# Patient Record
Sex: FEMALE | Race: WHITE | NOT HISPANIC OR LATINO
[De-identification: names, ages, dates, MRNs, and addresses within clinical notes are randomized per-mention and may not be internally consistent; named-entity substitution may affect disease eponyms.]

---

## 2017-08-24 ENCOUNTER — TRANSCRIPTION ENCOUNTER (OUTPATIENT)
Age: 42
End: 2017-08-24

## 2017-11-20 ENCOUNTER — TRANSCRIPTION ENCOUNTER (OUTPATIENT)
Age: 42
End: 2017-11-20

## 2017-11-23 ENCOUNTER — TRANSCRIPTION ENCOUNTER (OUTPATIENT)
Age: 42
End: 2017-11-23

## 2018-01-24 ENCOUNTER — TRANSCRIPTION ENCOUNTER (OUTPATIENT)
Age: 43
End: 2018-01-24

## 2019-03-21 ENCOUNTER — TRANSCRIPTION ENCOUNTER (OUTPATIENT)
Age: 44
End: 2019-03-21

## 2019-04-02 ENCOUNTER — APPOINTMENT (OUTPATIENT)
Dept: ORTHOPEDIC SURGERY | Facility: CLINIC | Age: 44
End: 2019-04-02
Payer: COMMERCIAL

## 2019-04-02 VITALS
DIASTOLIC BLOOD PRESSURE: 84 MMHG | SYSTOLIC BLOOD PRESSURE: 145 MMHG | BODY MASS INDEX: 27.58 KG/M2 | HEIGHT: 71 IN | WEIGHT: 197 LBS | TEMPERATURE: 98.4 F | HEART RATE: 73 BPM

## 2019-04-02 DIAGNOSIS — Z78.9 OTHER SPECIFIED HEALTH STATUS: ICD-10-CM

## 2019-04-02 DIAGNOSIS — Z87.39 PERSONAL HISTORY OF OTHER DISEASES OF THE MUSCULOSKELETAL SYSTEM AND CONNECTIVE TISSUE: ICD-10-CM

## 2019-04-02 DIAGNOSIS — Z82.61 FAMILY HISTORY OF ARTHRITIS: ICD-10-CM

## 2019-04-02 PROCEDURE — 99214 OFFICE O/P EST MOD 30 MIN: CPT

## 2019-04-02 PROCEDURE — 73560 X-RAY EXAM OF KNEE 1 OR 2: CPT | Mod: TC,RT

## 2019-04-09 ENCOUNTER — APPOINTMENT (OUTPATIENT)
Dept: ORTHOPEDIC SURGERY | Facility: CLINIC | Age: 44
End: 2019-04-09
Payer: COMMERCIAL

## 2019-04-09 PROCEDURE — 99214 OFFICE O/P EST MOD 30 MIN: CPT

## 2019-04-09 PROCEDURE — 73560 X-RAY EXAM OF KNEE 1 OR 2: CPT | Mod: TC,RT

## 2019-04-09 RX ORDER — PSYLLIUM HUSK 0.4 G
CAPSULE ORAL
Refills: 0 | Status: ACTIVE | COMMUNITY

## 2019-04-09 RX ORDER — CHOLECALCIFEROL (VITAMIN D3) 25 MCG
TABLET ORAL
Refills: 0 | Status: ACTIVE | COMMUNITY

## 2019-04-10 NOTE — REVIEW OF SYSTEMS
[Joint Pain] : joint pain [Joint Stiffness] : joint stiffness [Joint Swelling] : joint swelling [Feeling Weak] : feeling weak [Muscle Weakness] : muscle weakness [Easy Bruising] : a tendency for easy bruising [Negative] : Psychiatric

## 2019-04-10 NOTE — DISCUSSION/SUMMARY
[de-identified] : At this time, due to osteoarthritis of bilateral knees, the patient will be set up for Supartz injections.  She is a self-pay.  We are also ordering her a derotational brace for the left knee, which she states she does have, but it is years old and she is not getting the stability out of it that she should be.  We are going to order a new one for her left knee.  She will return to the office next week to start her injections.\par \par The patient has been advised that their blood pressure today was outside normal ranges and the patient was instructed accordingly. Our Blood Pressure Monitoring Sheet with instructions was given to the patient.\par \par

## 2019-04-10 NOTE — PHYSICAL EXAM
[de-identified] : Right Knee: \par Range of Motion in Degrees	\par 	                  Claimant:	Normal:	\par Flexion Active	  135 	                135-degrees	\par Flexion Passive	  135	                135-degrees	\par Extension Active	  0-5	                0-5-degrees	\par Extension Passive	  0-5	                0-5-degrees	\par \par No weakness to flexion/extension.  No evidence of instability in the AP plane or varus or valgus stress.  Negative  Lachman.  Negative pivot shift.  Negative anterior drawer test.  Negative posterior drawer test.  Negative Roma.  Negative Apley grind.  No medial or lateral joint line tenderness.  Positive tenderness over the medial and lateral facet of the patella.  Positive patellofemoral crepitations.  No lateral tilting patella.  No patella apprehension.  Positive crepitation in the medial and lateral femoral condyle.  No proximal or distal swelling, edema or tenderness.  No gross motor or sensory deficits.  Mild intra-articular swelling.  2+ DP and PT pulses.  No varus or valgus malalignment.  Skin is intact.  No rashes, scars or lesions. \par \par Left Knee: \par Range of Motion in Degrees	\par 	                  Claimant:	Normal:	\par Flexion Active	  135 	                135-degrees	\par Flexion Passive	  135	                135-degrees	\par Extension Active	  0-5	                0-5-degrees	\par Extension Passive	  0-5	                0-5-degrees	\par \par No weakness to flexion/extension.  No evidence of instability in the AP plane or varus or valgus stress.  Negative  Lachman.  Negative pivot shift.  Negative anterior drawer test.  Negative posterior drawer test.  Negative Roma.  Negative Apley grind.  No medial or lateral joint line tenderness.  Positive tenderness over the medial and lateral facet of the patella.  Positive patellofemoral crepitations.  No lateral tilting patella.  No patella apprehension.  Positive crepitation in the medial and lateral femoral condyle.  No proximal or distal swelling, edema or tenderness.  No gross motor or sensory deficits.  Mild intra-articular swelling.  2+ DP and PT pulses.  No varus or valgus malalignment.  Skin is intact.  No rashes, scars or lesions.\par  [de-identified] : Ambulating with a slightly antalgic to antalgic gait.  Station:  Normal.  [de-identified] : Appearance:  Well-developed, well-nourished female in no acute distress.\par \par \par  [de-identified] : Radiographs, one-two views of the right knee, one-two views of the left knee and one view AP Standing, show no acute fractures or dislocations.

## 2019-04-10 NOTE — ADDENDUM
[FreeTextEntry1] : This note was written by Eun Mckeon on 04/10/2019 acting as a scribe for [employer] BASURTO OTR/CHELSEY, [place]

## 2019-04-10 NOTE — HISTORY OF PRESENT ILLNESS
[de-identified] : The patient comes in today with bilateral knee pain.  The patient states the onset/injury occurred in 2013.  This injury is not work related or due to an automobile accident.  The patient describes the pain as achy and stiff.  The patient notes exercise, rest, ice and ibuprofen makes her symptoms better, while walking, bending and lying down make her symptoms worse.  \par  [7] : the relief from medicine is 7/10 [2] : the ailment interference is 2/10 [3] : the ailment interference is 3/10 [4] : the ailment interference is 4/10 [de-identified] : Ice [] : No [de-identified] : Elevation [de-identified] : Ibuprofen [de-identified] : Anti-inflammatory

## 2019-04-16 ENCOUNTER — APPOINTMENT (OUTPATIENT)
Dept: ORTHOPEDIC SURGERY | Facility: CLINIC | Age: 44
End: 2019-04-16
Payer: COMMERCIAL

## 2019-04-16 PROCEDURE — 20610 DRAIN/INJ JOINT/BURSA W/O US: CPT | Mod: 50

## 2019-04-16 NOTE — ADDENDUM
[FreeTextEntry1] : This note was written by Adela Moore on 04/16/2019 acting as scribe for Lizet Myles, OTR/L, PA.\par \par

## 2019-04-16 NOTE — PROCEDURE
[de-identified] : Reason for Visit: \par DOROTEO JOHNSON comes in today for the first Supartz injection to the left knee and right knee. \par \par Physical Examination:\par Left Knee:\par Knee: Range of Motion in Degrees  	\par    	                 Claimant:  	Normal:  	\par Flexion Active  	 135     	                135-degrees  	\par Flexion Passive  	 135  	                135-degrees  	\par Extension Active  	 0-5  	                0-5-degrees  	\par Extension Passive  	 0-5  	                0-5-degrees  	\par \par No evidence of instability in the AP plane or varus or valgus stress.  Negative  Lachman. Negative pivot shift.  Negative anterior drawer test.  Negative posterior drawer test. Negative Roma.  Negative Apley grind.  No medial or lateral joint line tenderness. Positive tenderness over the medial and lateral facet of the patella.  Positive patellofemoral crepitations.  No lateral tilting patella.  No patellar apprehension. Positive crepitation in the medial and lateral femoral condyle.  No proximal or distal swelling, edema or tenderness.  No gross motor or sensory deficits.  Mild intra-articular swelling.  No varus or valgus malalignment.  2+ DP and PT pulses.  Skin is intact.  No rashes, scars or lesions.  \par \par Right Knee:\par Knee: Range of Motion in Degrees  	\par    	                 Claimant:  	Normal:  	\par Flexion Active  	 135     	                135-degrees  	\par Flexion Passive  	 135  	                135-degrees  	\par Extension Active  	 0-5  	                0-5-degrees  	\par Extension Passive  	 0-5  	                0-5-degrees  	\par \par No evidence of instability in the AP plane or varus or valgus stress.  Negative  Lachman. Negative pivot shift.  Negative anterior drawer test.  Negative posterior drawer test. Negative Roma.  Negative Apley grind.  No medial or lateral joint line tenderness. Positive tenderness over the medial and lateral facet of the patella.  Positive patellofemoral crepitations.  No lateral tilting patella.  No patellar apprehension. Positive crepitation in the medial and lateral femoral condyle.  No proximal or distal swelling, edema or tenderness.  No gross motor or sensory deficits.  Mild intra-articular swelling.  No varus or valgus malalignment.  2+ DP and PT pulses.  Skin is intact.  No rashes, scars or lesions.  \par \par Impression: \par Osteoarthritis of the left knee\par \par Osteoarthritis of the right knee\par \par \par Consent:\par The risks and benefits of the procedure were discussed with the patient in detail.  Upon verbal consent of the patient, we proceeded with the Supartz injection as noted below.  \par \par Procedure:\par After sterile prep, the patient underwent a Supartz injection of 25 mg of Sodium Hyaluronate in a 2ML syringe into the right and left knees.  The patient tolerated the procedure well.  There were no complications.  \par \par NDC#: [address]-7\par Lot #:   [id number]                  \par Expiration: 06/30/21\par \par \par Plan:\par I have recommended ice and elevation.  The patient will be reassessed in one week for the next Supartz injection for the left and right knee osteoarthritis.   \par \par \par

## 2019-04-22 NOTE — PROCEDURE
[de-identified] : Reason for Visit: \par DOROTEO JOHNSON comes in today for the second Supartz injection to the left knee and right knee.\par \par Physical Examination:\par Left Knee:\par Knee: Range of Motion in Degrees  	\par    	                 Claimant:  	Normal:  	\par Flexion Active  	 135     	                135-degrees  	\par Flexion Passive  	 135  	                135-degrees  	\par Extension Active  	 0-5  	                0-5-degrees  	\par Extension Passive  	 0-5  	                0-5-degrees  	\par \par No evidence of instability in the AP plane or varus or valgus stress.  Negative  Lachman. Negative pivot shift.  Negative anterior drawer test.  Negative posterior drawer test. Negative Roma.  Negative Apley grind.  No medial or lateral joint line tenderness. Positive tenderness over the medial and lateral facet of the patella.  Positive patellofemoral crepitations.  No lateral tilting patella.  No patellar apprehension. Positive crepitation in the medial and lateral femoral condyle.  No proximal or distal swelling, edema or tenderness.  No gross motor or sensory deficits.  Mild intra-articular swelling.  No varus or valgus malalignment.  2+ DP and PT pulses.  Skin is intact.  No rashes, scars or lesions.  \par \par Right Knee:\par Knee: Range of Motion in Degrees  	\par    	                 Claimant:  	Normal:  	\par Flexion Active  	 135     	                135-degrees  	\par Flexion Passive  	 135  	                135-degrees  	\par Extension Active  	 0-5  	                0-5-degrees  	\par Extension Passive  	 0-5  	                0-5-degrees  	\par \par No evidence of instability in the AP plane or varus or valgus stress.  Negative  Lachman. Negative pivot shift.  Negative anterior drawer test.  Negative posterior drawer test. Negative Roma.  Negative Apley grind.  No medial or lateral joint line tenderness. Positive tenderness over the medial and lateral facet of the patella.  Positive patellofemoral crepitations.  No lateral tilting patella.  No patellar apprehension. Positive crepitation in the medial and lateral femoral condyle.  No proximal or distal swelling, edema or tenderness.  No gross motor or sensory deficits.  Mild intra-articular swelling.  No varus or valgus malalignment.  2+ DP and PT pulses.  Skin is intact.  No rashes, scars or lesions.  \par \par Impression: \par Osteoarthritis of the left knee\par \par Osteoarthritis of the right knee\par \par Consent:\par The risks and benefits of the procedure were discussed with the patient in detail.  Upon verbal consent of the patient, we proceeded with the Supartz injections as noted below.  \par \par Procedure:\par After sterile prep, the patient underwent a Supartz injection of 25 mg of Sodium Hyaluronate in a 2 mL syringe into the right and left knee.  The patient tolerated the procedures well.  There were no complications.  \par \par NDC#:  [address]-4\par Lot#:    [id number] \par Exp Date:   06/30/21\par \par Plan:\par I have recommended ice and elevation.  The patient will be reassessed in one week for the next Supartz injection for the left and right knee osteoarthritis.   \par \par

## 2019-04-22 NOTE — ADDENDUM
[FreeTextEntry1] : This note was written by Loco Mckeon on 04/22/2019, acting as a scribe for [employer] BAS[employer], AMADOR/CHELSEY PA

## 2019-04-23 ENCOUNTER — APPOINTMENT (OUTPATIENT)
Dept: ORTHOPEDIC SURGERY | Facility: CLINIC | Age: 44
End: 2019-04-23
Payer: COMMERCIAL

## 2019-04-23 PROCEDURE — 20610 DRAIN/INJ JOINT/BURSA W/O US: CPT | Mod: 50

## 2019-04-29 NOTE — PROCEDURE
[de-identified] : Reason for Visit: \par DOROTEO JOHNSON comes in today for the third Supartz injection to the left knee and right knee.\par \par Physical Examination:\par Left Knee:\par Range of Motion in Degrees  	\par    	                 Claimant:  	Normal:  	\par Flexion Active  	 135     	                135-degrees  	\par Flexion Passive  	 135  	                135-degrees  	\par Extension Active  	 0-5  	                0-5-degrees  	\par Extension Passive  	 0-5  	                0-5-degrees  	\par \par No evidence of instability in the AP plane or varus or valgus stress.  Negative  Lachman. Negative pivot shift.  Negative anterior drawer test.  Negative posterior drawer test. Negative Roma.  Negative Apley grind.  No medial or lateral joint line tenderness. Positive tenderness over the medial and lateral facet of the patella.  Positive patellofemoral crepitations.  No lateral tilting patella.  No patellar apprehension. Positive crepitation in the medial and lateral femoral condyle.  No proximal or distal swelling, edema or tenderness.  No gross motor or sensory deficits.  Mild intra-articular swelling.  No varus or valgus malalignment.  2+ DP and PT pulses.  Skin is intact.  No rashes, scars or lesions.  \par \par Right Knee:\par Range of Motion in Degrees  	\par    	                 Claimant:  	Normal:  	\par Flexion Active  	 135     	                135-degrees  	\par Flexion Passive  	 135  	                135-degrees  	\par Extension Active  	 0-5  	                0-5-degrees  	\par Extension Passive  	 0-5  	                0-5-degrees  	\par \par No evidence of instability in the AP plane or varus or valgus stress.  Negative  Lachman. Negative pivot shift.  Negative anterior drawer test.  Negative posterior drawer test. Negative Roma.  Negative Apley grind.  No medial or lateral joint line tenderness. Positive tenderness over the medial and lateral facet of the patella.  Positive patellofemoral crepitations.  No lateral tilting patella.  No patellar apprehension. Positive crepitation in the medial and lateral femoral condyle.  No proximal or distal swelling, edema or tenderness.  No gross motor or sensory deficits.  Mild intra-articular swelling.  No varus or valgus malalignment.  2+ DP and PT pulses.  Skin is intact.  No rashes, scars or lesions.  \par \par Impression: \par Osteoarthritis of the left knee\par \par Osteoarthritis of the right knee\par \par Consent:\par The risks and benefits of the procedure were discussed with the patient in detail.  Upon verbal consent of the patient, we proceeded with the Supartz injections as noted below.  \par \par Procedure:\par After sterile prep, the patient underwent a Supartz injection of 25 mg of Sodium Hyaluronate in a 2 mL syringe into the right and left knee.  The patient tolerated the procedures well.  There were no complications.  \par \par NDC#:  [address]-9\par Lot#:    [id number] \par Expiration:   06/30/21\par \par Plan:\par I have recommended ice and elevation.  The patient will be reassessed in one week for the next Supartz injection to the left and right knee osteoarthritis.   \par \par

## 2019-04-29 NOTE — ADDENDUM
[FreeTextEntry1] : This note was written by Eun Mckeon on 04/29/2019 acting as a scribe for [employer] BASURTO

## 2019-04-30 ENCOUNTER — APPOINTMENT (OUTPATIENT)
Dept: ORTHOPEDIC SURGERY | Facility: CLINIC | Age: 44
End: 2019-04-30
Payer: COMMERCIAL

## 2019-04-30 PROCEDURE — 20610 DRAIN/INJ JOINT/BURSA W/O US: CPT | Mod: LT

## 2019-05-06 NOTE — ADDENDUM
[FreeTextEntry1] : This note was written by Loco Mckeon on 05/06/2019, acting as a scribe for [employer] BASURTO, AMADOR/CHELSEY, PA \par

## 2019-05-06 NOTE — PROCEDURE
[de-identified] : Reason for Visit: \par DOROTEO JOHNSON comes in today for the fourth Supartz injection to the left knee and right knee.\par \par Physical Examination:\par Left Knee:\par Knee: Range of Motion in Degrees  	\par    	                 Claimant:  	Normal:  	\par Flexion Active  	 135     	                135-degrees  	\par Flexion Passive  	 135  	                135-degrees  	\par Extension Active  	 0-5  	                0-5-degrees  	\par Extension Passive  	 0-5  	                0-5-degrees  	\par \par No evidence of instability in the AP plane or varus or valgus stress.  Negative  Lachman. Negative pivot shift.  Negative anterior drawer test.  Negative posterior drawer test. Negative Roma.  Negative Apley grind.  No medial or lateral joint line tenderness. Positive tenderness over the medial and lateral facet of the patella.  Positive patellofemoral crepitations.  No lateral tilting patella.  No patellar apprehension. Positive crepitation in the medial and lateral femoral condyle.  No proximal or distal swelling, edema or tenderness.  No gross motor or sensory deficits.  Mild intra-articular swelling.  No varus or valgus malalignment.  2+ DP and PT pulses.  Skin is intact.  No rashes, scars or lesions.  \par \par Right Knee:\par Knee: Range of Motion in Degrees  	\par    	                 Claimant:  	Normal:  	\par Flexion Active  	 135     	                135-degrees  	\par Flexion Passive  	 135  	                135-degrees  	\par Extension Active  	 0-5  	                0-5-degrees  	\par Extension Passive  	 0-5  	                0-5-degrees  	\par \par No evidence of instability in the AP plane or varus or valgus stress.  Negative  Lachman. Negative pivot shift.  Negative anterior drawer test.  Negative posterior drawer test. Negative Roma.  Negative Apley grind.  No medial or lateral joint line tenderness. Positive tenderness over the medial and lateral facet of the patella.  Positive patellofemoral crepitations.  No lateral tilting patella.  No patellar apprehension. Positive crepitation in the medial and lateral femoral condyle.  No proximal or distal swelling, edema or tenderness.  No gross motor or sensory deficits.  Mild intra-articular swelling.  No varus or valgus malalignment.  2+ DP and PT pulses.  Skin is intact.  No rashes, scars or lesions.  \par \par Impression: \par Osteoarthritis of the left knee\par \par Osteoarthritis of the right knee\par \par Consent:\par The risks and benefits of the procedure were discussed with the patient in detail.  Upon verbal consent of the patient, we proceeded with the Supartz injections as noted below.  \par \par Procedure:\par After sterile prep, the patient underwent a Supartz injection of 25 mg of Sodium Hyaluronate in a 2 mL syringe into the right and left knee.  The patient tolerated the procedures well.  There were no complications.  \par \par NDC#:  [address]-2\par Lot#:    [id number]\par Exp Date:  06/30/21  \par \par Plan:\par I have recommended ice and elevation.  The patient will be reassessed in one week for the next Supartz injection for the left and right knee osteoarthritis.  \par  \par \par

## 2019-05-07 ENCOUNTER — APPOINTMENT (OUTPATIENT)
Dept: ORTHOPEDIC SURGERY | Facility: CLINIC | Age: 44
End: 2019-05-07
Payer: COMMERCIAL

## 2019-05-07 PROCEDURE — 20610 DRAIN/INJ JOINT/BURSA W/O US: CPT | Mod: LT

## 2019-05-10 NOTE — PROCEDURE
[de-identified] : Reason for Visit: \par DOROTEO JOHNSON comes in today for the fifth Supartz injection to the left knee and right knee.\par \par Physical Examination:\par Left Knee:\par Knee: Range of Motion in Degrees  	\par    	                 Claimant:  	Normal:  	\par Flexion Active  	 135     	                135-degrees  	\par Flexion Passive  	 135  	                135-degrees  	\par Extension Active  	 0-5  	                0-5-degrees  	\par Extension Passive  	 0-5  	                0-5-degrees  	\par \par No evidence of instability in the AP plane or varus or valgus stress.  Negative  Lachman. Negative pivot shift.  Negative anterior drawer test.  Negative posterior drawer test. Negative Roma.  Negative Apley grind.  No medial or lateral joint line tenderness. Positive tenderness over the medial and lateral facet of the patella.  Positive patellofemoral crepitations.  No lateral tilting patella.  No patellar apprehension. Positive crepitation in the medial and lateral femoral condyle.  No proximal or distal swelling, edema or tenderness.  No gross motor or sensory deficits.  Mild intra-articular swelling.  No varus or valgus malalignment.  2+ DP and PT pulses.  Skin is intact.  No rashes, scars or lesions.  \par \par Right Knee:\par Knee: Range of Motion in Degrees  	\par    	                 Claimant:  	Normal:  	\par Flexion Active  	 135     	                135-degrees  	\par Flexion Passive  	 135  	                135-degrees  	\par Extension Active  	 0-5  	                0-5-degrees  	\par Extension Passive  	 0-5  	                0-5-degrees  	\par \par No evidence of instability in the AP plane or varus or valgus stress.  Negative  Lachman. Negative pivot shift.  Negative anterior drawer test.  Negative posterior drawer test. Negative Roma.  Negative Apley grind.  No medial or lateral joint line tenderness. Positive tenderness over the medial and lateral facet of the patella.  Positive patellofemoral crepitations.  No lateral tilting patella.  No patellar apprehension. Positive crepitation in the medial and lateral femoral condyle.  No proximal or distal swelling, edema or tenderness.  No gross motor or sensory deficits.  Mild intra-articular swelling.  No varus or valgus malalignment.  2+ DP and PT pulses.  Skin is intact.  No rashes, scars or lesions.  \par \par Impression: \par Osteoarthritis of the left knee\par \par Osteoarthritis of the right knee\par \par Consent:\par The risks and benefits of the procedure were discussed with the patient in detail.  Upon verbal consent of the patient, we proceeded with the Supartz injections as noted below.  \par \par Procedure:\par After sterile prep, the patient underwent a Supartz injection of 25 mg of Sodium Hyaluronate in a 2 mL syringe into the right and left knee.  The patient tolerated the procedures well.  There were no complications.  \par \par NDC#:  [address]-0\par Lot#:    [id number] \par Exp Date:   06/30/21\par \par Plan:\par I have recommended ice and elevation.  The patient will be reassessed in 6-8 weeks for the left and right knee osteoarthritis.  \par \par \par \par  \par \par

## 2019-05-10 NOTE — ADDENDUM
[FreeTextEntry1] : This note was written by Loco Mckeon on 05/10/2019, acting as a scribe for [employer] BASURTO, AMADOR/CHELSEY PA

## 2019-05-28 ENCOUNTER — TRANSCRIPTION ENCOUNTER (OUTPATIENT)
Age: 44
End: 2019-05-28

## 2020-10-20 ENCOUNTER — APPOINTMENT (OUTPATIENT)
Dept: ORTHOPEDIC SURGERY | Facility: CLINIC | Age: 45
End: 2020-10-20
Payer: COMMERCIAL

## 2020-10-20 VITALS
DIASTOLIC BLOOD PRESSURE: 86 MMHG | WEIGHT: 220 LBS | HEART RATE: 72 BPM | TEMPERATURE: 98.9 F | BODY MASS INDEX: 30.8 KG/M2 | HEIGHT: 71 IN | SYSTOLIC BLOOD PRESSURE: 134 MMHG

## 2020-10-20 PROCEDURE — 99213 OFFICE O/P EST LOW 20 MIN: CPT

## 2020-10-20 PROCEDURE — 99072 ADDL SUPL MATRL&STAF TM PHE: CPT

## 2020-10-20 PROCEDURE — 73560 X-RAY EXAM OF KNEE 1 OR 2: CPT | Mod: 26,RT

## 2020-10-20 RX ORDER — SODIUM HYALURONATE INTRA-ARTICULAR SOLN PREF SYR 25 MG/2.5ML 25/2.5 MG/ML
25 SOLUTION PREFILLED SYRINGE INTRAARTICULAR
Qty: 10 | Refills: 0 | Status: ACTIVE | OUTPATIENT
Start: 2020-10-20

## 2020-10-22 RX ORDER — HYALURONATE SODIUM 30 MG/2 ML
30 SYRINGE (ML) INTRAARTICULAR
Qty: 8 | Refills: 0 | Status: ACTIVE | OUTPATIENT
Start: 2020-10-22

## 2020-10-22 NOTE — DISCUSSION/SUMMARY
[de-identified] : At this time, due to osteoarthritis of bilateral knees, we will order Supartz injections again for bilateral knees.  She will return to the office when we get them in.

## 2020-10-22 NOTE — ADDENDUM
[FreeTextEntry1] : This note was written by Eun Mckeon on 10/22/2020 acting as scribe for Lizet Myles, OTR/L, PA

## 2020-10-22 NOTE — PHYSICAL EXAM
[de-identified] : Right Knee: (Right knee is worse than her left knee)\par Range of Motion in Degrees	\par 	                  Claimant:	Normal:	\par Flexion Active	  135 	                135-degrees	\par Flexion Passive	  135	                135-degrees	\par Extension Active	  0-5	                0-5-degrees	\par Extension Passive	  0-5	                0-5-degrees	\par \par No weakness to flexion/extension.  No evidence of instability in the AP plane or varus or valgus stress.  Negative  Lachman.  Negative pivot shift.  Negative anterior drawer test.  Negative posterior drawer test.  Negative Roma.  Negative Apley grind.  No medial or lateral joint line tenderness.  Positive tenderness over the medial and lateral facet of the patella.  Positive patellofemoral crepitations.  No lateral tilting patella.  No patella apprehension.  Positive crepitation in the medial and lateral femoral condyle.  No proximal or distal swelling, edema or tenderness.  No gross motor or sensory deficits.  Mild intra-articular swelling.  2+ DP and PT pulses.  No varus or valgus malalignment.  Skin is intact.  No rashes, scars or lesions. \par \par Left Knee: \par Range of Motion in Degrees	\par 	                  Claimant:	Normal:	\par Flexion Active	  135 	                135-degrees	\par Flexion Passive	  135	                135-degrees	\par Extension Active	  0-5	                0-5-degrees	\par Extension Passive	  0-5	                0-5-degrees	\par \par No weakness to flexion/extension.  No evidence of instability in the AP plane or varus or valgus stress.  Negative  Lachman.  Negative pivot shift.  Negative anterior drawer test.  Negative posterior drawer test.  Negative Roma.  Negative Apley grind.  No medial or lateral joint line tenderness.  Positive tenderness over the medial and lateral facet of the patella.  Positive patellofemoral crepitations.  No lateral tilting patella.  No patella apprehension.  Positive crepitation in the medial and lateral femoral condyle.  No proximal or distal swelling, edema or tenderness.  No gross motor or sensory deficits.  Mild intra-articular swelling.  2+ DP and PT pulses.  No varus or valgus malalignment.  Skin is intact.  No rashes, scars or lesions. \par  [de-identified] : Ambulating with a slightly antalgic to antalgic gait.  Station:  Normal.  [de-identified] : Appearance:  Well-developed, well-nourished female in no acute distress.\par   [de-identified] : Radiographs, two views of the right knee, reveals that she does have medial joint line pain with some narrowing of the medial joint line and moderate arthritis of the right knee.

## 2020-10-30 ENCOUNTER — APPOINTMENT (OUTPATIENT)
Dept: ORTHOPEDIC SURGERY | Facility: CLINIC | Age: 45
End: 2020-10-30
Payer: COMMERCIAL

## 2020-10-30 PROCEDURE — 20610 DRAIN/INJ JOINT/BURSA W/O US: CPT | Mod: 50

## 2020-10-30 PROCEDURE — 99072 ADDL SUPL MATRL&STAF TM PHE: CPT

## 2020-11-02 NOTE — ADDENDUM
[FreeTextEntry1] : This note was written by Loco Mckeon on 11/02/2020, acting as a scribe for NATO BASURTO, AMADOR/L, PA

## 2020-11-06 ENCOUNTER — APPOINTMENT (OUTPATIENT)
Dept: ORTHOPEDIC SURGERY | Facility: CLINIC | Age: 45
End: 2020-11-06
Payer: COMMERCIAL

## 2020-11-06 PROCEDURE — 20610 DRAIN/INJ JOINT/BURSA W/O US: CPT | Mod: 50

## 2020-11-06 PROCEDURE — 99072 ADDL SUPL MATRL&STAF TM PHE: CPT

## 2020-11-10 NOTE — ADDENDUM
[FreeTextEntry1] : This note was written by Loco Mckeon on 11/10/2020, acting as a scribe for NATO BASURTO, AMADOR/L, PA

## 2020-11-10 NOTE — PROCEDURE
[de-identified] : \par Physical Examination\par Right Knee:\par Knee: Range of Motion in Degrees  	\par    	                 Claimant:  	Normal:  	\par Flexion Active  	 135     	                135-degrees  	\par Flexion Passive  	 135  	                135-degrees  	\par Extension Active  	 0-5  	                0-5-degrees  	\par Extension Passive  	 0-5  	                0-5-degrees  	\par \par No evidence of instability in the AP plane or varus or valgus stress.  Negative  Lachman. Negative pivot shift.  Negative anterior drawer test.  Negative posterior drawer test.  Negative Roma.  Negative Apley grind.  No medial or lateral joint line tenderness.  Positive tenderness over the medial and lateral facet of the patella.  Positive patellofemoral crepitations.  No lateral tilting patella.  No patellar apprehension.  Positive crepitation in the medial and lateral femoral condyle.  No proximal or distal swelling, edema or tenderness.  No gross motor or sensory deficits.  Mild intra-articular swelling.  No varus or valgus malalignment.  2+ DP and PT pulses.  Skin is intact.  No rashes, scars or lesions.  \par \par Left Knee:\par Knee: Range of Motion in Degrees  	\par    	                 Claimant:  	Normal:  	\par Flexion Active  	 135     	                135-degrees  	\par Flexion Passive  	 135  	                135-degrees  	\par Extension Active  	 0-5  	                0-5-degrees  	\par Extension Passive  	 0-5  	                0-5-degrees  	\par \par No evidence of instability in the AP plane or varus or valgus stress.  Negative  Lachman. Negative pivot shift.  Negative anterior drawer test.  Negative posterior drawer test.  Negative Roma.  Negative Apley grind.  No medial or lateral joint line tenderness.  Positive tenderness over the medial and lateral facet of the patella.  Positive patellofemoral crepitations.  No lateral tilting patella.  No patellar apprehension.  Positive crepitation in the medial and lateral femoral condyle.  No proximal or distal swelling, edema or tenderness.  No gross motor or sensory deficits.  Mild intra-articular swelling.  No varus or valgus malalignment.  2+ DP and PT pulses.  Skin is intact.  No rashes, scars or lesions.  \par \par Impression: \par Osteoarthritis of the right knee\par Osteoarthritis of the left knee \par \par Consent:\par The risks and benefits of the procedure were discussed with the patient in detail.  Upon verbal consent of the patient, we proceeded with the Orthovisc injections as noted below.  \par \par Procedure: \par After sterile prep, the patient underwent an Orthovisc injection of 30 mg of Sodium Hyaluronate in a 2 mL syringe into the right and left knee.  The patient tolerated the procedure well.  There were no complications.   \par \par NDC#:  [address]-51\par Lot#:    [id number]\par Exp. Date:  05/22\par \par Plan: \par At this time, I have recommended ice and elevation.  The patient will be reassessed in one week for the next Orthovisc injection for the osteoarthritis of the right and left knee.\par

## 2020-11-13 ENCOUNTER — APPOINTMENT (OUTPATIENT)
Dept: ORTHOPEDIC SURGERY | Facility: CLINIC | Age: 45
End: 2020-11-13
Payer: COMMERCIAL

## 2020-11-13 PROCEDURE — 20610 DRAIN/INJ JOINT/BURSA W/O US: CPT | Mod: 50

## 2020-11-14 ENCOUNTER — TRANSCRIPTION ENCOUNTER (OUTPATIENT)
Age: 45
End: 2020-11-14

## 2020-11-17 NOTE — ADDENDUM
[FreeTextEntry1] : This note was written by Adela Moore on 11/17/2020 acting as scribe for Lizet Myles, SOFIAR/CHELSEY, PA.\par

## 2020-11-17 NOTE — PROCEDURE
[de-identified] : Physical Examination\par Right Knee:\par Knee: Range of Motion in Degrees  	\par    	                 Claimant:  	Normal:  	\par Flexion Active  	 135     	                135-degrees  	\par Flexion Passive  	 135  	                135-degrees  	\par Extension Active  	 0-5  	                0-5-degrees  	\par Extension Passive  	 0-5  	                0-5-degrees  	\par \par No evidence of instability in the AP plane or varus or valgus stress.  Negative  Lachman. Negative pivot shift.  Negative anterior drawer test.  Negative posterior drawer test.  Negative Roma.  Negative Apley grind.  No medial or lateral joint line tenderness.  Positive tenderness over the medial and lateral facet of the patella.  Positive patellofemoral crepitations.  No lateral tilting patella.  No patellar apprehension.  Positive crepitation in the medial and lateral femoral condyle.  No proximal or distal swelling, edema or tenderness.  No gross motor or sensory deficits.  Mild intra-articular swelling.  No varus or valgus malalignment.  2+ DP and PT pulses.  Skin is intact.  No rashes, scars or lesions.  \par \par Left Knee:\par Knee: Range of Motion in Degrees  	\par    	                 Claimant:  	Normal:  	\par Flexion Active  	 135     	                135-degrees  	\par Flexion Passive  	 135  	                135-degrees  	\par Extension Active  	 0-5  	                0-5-degrees  	\par Extension Passive  	 0-5  	                0-5-degrees  	\par \par No evidence of instability in the AP plane or varus or valgus stress.  Negative  Lachman. Negative pivot shift.  Negative anterior drawer test.  Negative posterior drawer test.  Negative Roma.  Negative Apley grind.  No medial or lateral joint line tenderness.  Positive tenderness over the medial and lateral facet of the patella.  Positive patellofemoral crepitations.  No lateral tilting patella.  No patellar apprehension.  Positive crepitation in the medial and lateral femoral condyle.  No proximal or distal swelling, edema or tenderness.  No gross motor or sensory deficits.  Mild intra-articular swelling.  No varus or valgus malalignment.  2+ DP and PT pulses.  Skin is intact.  No rashes, scars or lesions.  \par \par Impression: \par Osteoarthritis of the right knee\par Osteoarthritis of the left knee \par \par Consent:\par The risks and benefits of the procedure were discussed with the patient in detail.  Upon verbal consent of the patient, we proceeded with the Orthovisc injections as noted below.  \par \par Procedure: \par After sterile prep, the patient underwent an Orthovisc injection of 30 mg of Sodium Hyaluronate in a 2 mL syringe into the right and left knee.  The patient tolerated the procedures well.  There were no complications.   \par \par NDC#:  [address]-86\par Lot#:    [id number]\par Exp. Date:  05/22\par \par Plan: \par At this time, I have recommended ice and elevation.  The patient will be reassessed in one week for the next Orthovisc injection for the osteoarthritis of the right and left knee.\par

## 2020-11-20 ENCOUNTER — APPOINTMENT (OUTPATIENT)
Dept: ORTHOPEDIC SURGERY | Facility: CLINIC | Age: 45
End: 2020-11-20

## 2020-11-23 ENCOUNTER — APPOINTMENT (OUTPATIENT)
Dept: ORTHOPEDIC SURGERY | Facility: CLINIC | Age: 45
End: 2020-11-23
Payer: COMMERCIAL

## 2020-11-23 PROCEDURE — 20610 DRAIN/INJ JOINT/BURSA W/O US: CPT | Mod: 50

## 2020-11-30 NOTE — PROCEDURE
[de-identified] : Physical Examination\par Right Knee:\par Range of Motion in Degrees  	\par    	                 Claimant:  	Normal:  	\par Flexion Active  	 135     	                135-degrees  	\par Flexion Passive  	 135  	                135-degrees  	\par Extension Active  	 0-5  	                0-5-degrees  	\par Extension Passive  	 0-5  	                0-5-degrees  	\par \par No weakness to flexion/extension.  No evidence of instability in the AP plane or varus or valgus stress.  Negative  Lachman. Negative pivot shift.  Negative anterior drawer test.  Negative posterior drawer test.  Negative Roma.  Negative Apley grind.  No medial or lateral joint line tenderness.  Positive tenderness over the medial and lateral facet of the patella.  Positive patellofemoral crepitations.  No lateral tilting patella.  No patellar apprehension.  Positive crepitation in the medial and lateral femoral condyle.  No proximal or distal swelling, edema or tenderness.  No gross motor or sensory deficits.  Mild intra-articular swelling.  No varus or valgus malalignment.  2+ DP and PT pulses.  Skin is intact.  No rashes, scars or lesions.  \par \par Left Knee:\par Range of Motion in Degrees  	\par    	                 Claimant:  	Normal:  	\par Flexion Active  	 135     	                135-degrees  	\par Flexion Passive  	 135  	                135-degrees  	\par Extension Active  	 0-5  	                0-5-degrees  	\par Extension Passive  	 0-5  	                0-5-degrees  	\par \par No weakness to flexion/extension.  No evidence of instability in the AP plane or varus or valgus stress.  Negative  Lachman. Negative pivot shift.  Negative anterior drawer test.  Negative posterior drawer test.  Negative Roma.  Negative Apley grind.  No medial or lateral joint line tenderness.  Positive tenderness over the medial and lateral facet of the patella.  Positive patellofemoral crepitations.  No lateral tilting patella.  No patellar apprehension.  Positive crepitation in the medial and lateral femoral condyle.  No proximal or distal swelling, edema or tenderness.  No gross motor or sensory deficits.  Mild intra-articular swelling.  No varus or valgus malalignment.  2+ DP and PT pulses.  Skin is intact.  No rashes, scars or lesions.  \par \par Consent:\par The risks and benefits of the procedure were discussed with the patient in detail.  Upon verbal consent of the patient, we proceeded with the Orthovisc injections as noted below.  \par \par Procedure: \par After sterile prep, the patient underwent an Orthovisc injection of 30 mg of Sodium Hyaluronate in a 2 mL syringe into the right and left knee.  The patient tolerated the procedures well.  There were no complications.   \par \par NDC#:  [address]-87\par Lot#:    [id number]\par Exp. Date:  05/22\par \par Plan: \par At this time, I have recommended ice and elevation.  The patient will be reassessed in six to eight weeks following this series of Orthovisc injections for the osteoarthritis of the right and left knee.

## 2020-11-30 NOTE — ADDENDUM
[FreeTextEntry1] : This note was written by Evelin Watts on 11/27/2020 acting as scribe for Srinivas Garcia III, MD

## 2020-12-04 ENCOUNTER — APPOINTMENT (OUTPATIENT)
Dept: ORTHOPEDIC SURGERY | Facility: CLINIC | Age: 45
End: 2020-12-04

## 2021-03-11 ENCOUNTER — TRANSCRIPTION ENCOUNTER (OUTPATIENT)
Age: 46
End: 2021-03-11

## 2021-09-07 ENCOUNTER — TRANSCRIPTION ENCOUNTER (OUTPATIENT)
Age: 46
End: 2021-09-07

## 2021-09-07 ENCOUNTER — APPOINTMENT (OUTPATIENT)
Dept: ORTHOPEDIC SURGERY | Facility: CLINIC | Age: 46
End: 2021-09-07
Payer: COMMERCIAL

## 2021-09-07 VITALS
WEIGHT: 205 LBS | BODY MASS INDEX: 28.7 KG/M2 | DIASTOLIC BLOOD PRESSURE: 89 MMHG | HEIGHT: 71 IN | HEART RATE: 72 BPM | SYSTOLIC BLOOD PRESSURE: 134 MMHG

## 2021-09-07 PROCEDURE — 99213 OFFICE O/P EST LOW 20 MIN: CPT

## 2021-09-07 PROCEDURE — 73560 X-RAY EXAM OF KNEE 1 OR 2: CPT | Mod: LT

## 2021-09-07 RX ORDER — HYALURONATE SODIUM 20 MG/2 ML
20 SYRINGE (ML) INTRAARTICULAR
Qty: 6 | Refills: 0 | Status: ACTIVE | OUTPATIENT
Start: 2021-09-07

## 2021-09-07 RX ORDER — DICLOFENAC SODIUM 75 MG/1
75 TABLET, DELAYED RELEASE ORAL
Qty: 60 | Refills: 0 | Status: ACTIVE | COMMUNITY
Start: 2021-09-07 | End: 1900-01-01

## 2021-09-07 RX ORDER — NYSTATIN 100000 [USP'U]/G
100000 CREAM TOPICAL
Qty: 30 | Refills: 0 | Status: ACTIVE | COMMUNITY
Start: 2021-08-11

## 2021-09-07 NOTE — PHYSICAL EXAM
[de-identified] : Right Knee: \par Range of Motion in Degrees	\par 	                  Claimant:	Normal:	\par Flexion Active	  135 	                135-degrees	\par Flexion Passive	  135	                135-degrees	\par Extension Active	  0-5	                0-5-degrees	\par Extension Passive	  0-5	                0-5-degrees	\par \par No weakness to flexion/extension.  No evidence of instability in the AP plane or varus or valgus stress.  Negative  Lachman.  Negative pivot shift.  Negative anterior drawer test.  Negative posterior drawer test.  Negative Roma.  Negative Apley grind.  No medial or lateral joint line tenderness.  Positive tenderness over the medial and lateral facet of the patella.  Positive patellofemoral crepitations.  No lateral tilting patella.  No patella apprehension.  Positive crepitation in the medial and lateral femoral condyle.  No proximal or distal swelling, edema or tenderness.  No gross motor or sensory deficits.  Mild intra-articular swelling.  2+ DP and PT pulses.  No varus or valgus malalignment.  Skin is intact.  No rashes, scars or lesions. \par \par Left Knee: \par Range of Motion in Degrees	\par 	                  Claimant:	Normal:	\par Flexion Active	  135 	                135-degrees	\par Flexion Passive	  135	                135-degrees	\par Extension Active	  0-5	                0-5-degrees	\par Extension Passive	  0-5	                0-5-degrees	\par \par No weakness to flexion/extension.  No evidence of instability in the AP plane or varus or valgus stress.  Negative  Lachman.  Negative pivot shift.  Negative anterior drawer test.  Negative posterior drawer test.  Negative Roma.  Negative Apley grind.  No medial or lateral joint line tenderness.  Positive tenderness over the medial and lateral facet of the patella.  Positive patellofemoral crepitations.  No lateral tilting patella.  No patella apprehension.  Positive crepitation in the medial and lateral femoral condyle.  No proximal or distal swelling, edema or tenderness.  No gross motor or sensory deficits.  Mild intra-articular swelling.  2+ DP and PT pulses.  No varus or valgus malalignment.  Skin is intact.  No rashes, scars or lesions. \par  [de-identified] : Ambulating with a slightly antalgic to antalgic gait.  Station:  Normal.  [de-identified] : Appearance:  Well-developed, well-nourished female in no acute distress.\par   [de-identified] : Radiographs, one-two views of the right knee, one-two views of the left knee and one view AP Standing, reveal osteoarthritis of bilateral knees.

## 2021-09-07 NOTE — HISTORY OF PRESENT ILLNESS
[de-identified] : The patient comes in today with complaints of bilateral knee pain. She states her right is worse than her left at this time.  She states that it has been swelling, she has been icing and taking anti-inflammatory.

## 2021-09-07 NOTE — ADDENDUM
[FreeTextEntry1] : This note was written by Eun Mckeon on 09/07/2021 acting as scribe for Lizet Myles, OTR/L, PA

## 2021-09-07 NOTE — DISCUSSION/SUMMARY
[de-identified] : At this time, due to osteoarthritis of bilateral knees, we reordered Euflexxa for her bilateral knees. She will ice and take an anti-inflammatory until we get the Euflexxa injections. She will return to the office to start them.  We also gave her a prescription for an anti-inflammatory at this time.

## 2021-09-08 RX ORDER — HYALURONATE SODIUM 30 MG/2 ML
30 SYRINGE (ML) INTRAARTICULAR
Qty: 8 | Refills: 0 | Status: ACTIVE | OUTPATIENT
Start: 2021-09-08

## 2021-09-24 ENCOUNTER — APPOINTMENT (OUTPATIENT)
Dept: ORTHOPEDIC SURGERY | Facility: CLINIC | Age: 46
End: 2021-09-24
Payer: COMMERCIAL

## 2021-09-24 PROCEDURE — 20610 DRAIN/INJ JOINT/BURSA W/O US: CPT | Mod: 50

## 2021-09-29 NOTE — PROCEDURE
[de-identified] : Physical Examination\par Right Knee: \par Range of Motion in Degrees	\par 	  Claimant:	Normal:	\par Flexion Active	 135 	 135-degrees	\par Flexion Passive	 135	 135-degrees	\par Extension Active	 0-5	 0-5-degrees	\par Extension Passive	 0-5	 0-5-degrees	\par \par No weakness to flexion/extension. No evidence of instability in the AP plane or varus or valgus stress. Negative Lachman. Negative pivot shift. Negative anterior drawer test. Negative posterior drawer test. Negative Roma. Negative Apley grind. No medial or lateral joint line tenderness. Positive tenderness over the medial and lateral facet of the patella. Positive patellofemoral crepitations. No lateral tilting patella. No patella apprehension. Positive crepitation in the medial and lateral femoral condyle. No proximal or distal swelling, edema or tenderness. No gross motor or sensory deficits. Mild intra-articular swelling. 2+ DP and PT pulses. No varus or valgus malalignment. Skin is intact. No rashes, scars or lesions. \par \par Left Knee: \par Range of Motion in Degrees	\par 	  Claimant:	Normal:	\par Flexion Active	 135 	 135-degrees	\par Flexion Passive	 135	 135-degrees	\par Extension Active	 0-5	 0-5-degrees	\par Extension Passive	 0-5	 0-5-degrees	\par \par No weakness to flexion/extension. No evidence of instability in the AP plane or varus or valgus stress. Negative Lachman. Negative pivot shift. Negative anterior drawer test. Negative posterior drawer test. Negative Roma. Negative Apley grind. No medial or lateral joint line tenderness. Positive tenderness over the medial and lateral facet of the patella. Positive patellofemoral crepitations. No lateral tilting patella. No patella apprehension. Positive crepitation in the medial and lateral femoral condyle. No proximal or distal swelling, edema or tenderness. No gross motor or sensory deficits. Mild intra-articular swelling. 2+ DP and PT pulses. No varus or valgus malalignment. Skin is intact. No rashes, scars or lesions. \par \par Impression: \par Osteoarthritis of the right knee\par Osteoarthritis of the left knee \par \par Consent:\par The risks and benefits of the procedure were discussed with the patient in detail.  Upon verbal consent of the patient, we proceeded with the Orthovisc injection as noted below.  \par \par Procedure: \par After sterile prep, the patient underwent an Orthovisc injection of 30 mg of Sodium Hyaluronate in a 2ML syringe into the right and left knee.  The patient tolerated the procedure well.  There were no complications.  \par \par NDC #:  [address]-37\par Lot #:    [id number]\par Expiration:  6/30/2023\par \par NDC #:  [address]-65\par Lot #:    [id number]\par Expiration:  8/31/2023\par \par Plan: \par At this time, I have recommended ice and elevation.  The patient will be reassessed in one week for the next Orthovisc injection for the osteoarthritis of the right and left knee.

## 2021-09-29 NOTE — ADDENDUM
[FreeTextEntry1] : This note was written by Eun Mckeon on 09/29/2021 acting as scribe for Lizet Myles, OTR/L, PA

## 2021-10-01 ENCOUNTER — APPOINTMENT (OUTPATIENT)
Dept: ORTHOPEDIC SURGERY | Facility: CLINIC | Age: 46
End: 2021-10-01
Payer: COMMERCIAL

## 2021-10-01 PROCEDURE — 20610 DRAIN/INJ JOINT/BURSA W/O US: CPT | Mod: 50

## 2021-10-06 NOTE — ADDENDUM
[FreeTextEntry1] : This note was written by Loco Mckeon on 10/06/2021, acting as a scribe for [employer] BASURTO, AMADOR/L, PA

## 2021-10-06 NOTE — PROCEDURE
[de-identified] : \par Physical Examination\par Right Knee:\par Knee: Range of Motion in Degrees  	\par    	                 Claimant:  	Normal:  	\par Flexion Active  	 135     	                135-degrees  	\par Flexion Passive  	 135  	                135-degrees  	\par Extension Active  	 0-5  	                0-5-degrees  	\par Extension Passive  	 0-5  	                0-5-degrees  	\par \par No evidence of instability in the AP plane or varus or valgus stress.  Negative  Lachman. Negative pivot shift.  Negative anterior drawer test.  Negative posterior drawer test.  Negative Roma.  Negative Apley grind.  No medial or lateral joint line tenderness.  Positive tenderness over the medial and lateral facet of the patella.  Positive patellofemoral crepitations.  No lateral tilting patella.  No patellar apprehension.  Positive crepitation in the medial and lateral femoral condyle.  No proximal or distal swelling, edema or tenderness.  No gross motor or sensory deficits.  Mild intra-articular swelling.  No varus or valgus malalignment.  2+ DP and PT pulses.  Skin is intact.  No rashes, scars or lesions.  \par \par Left Knee:\par Knee: Range of Motion in Degrees  	\par    	                 Claimant:  	Normal:  	\par Flexion Active  	 135     	                135-degrees  	\par Flexion Passive  	 135  	                135-degrees  	\par Extension Active  	 0-5  	                0-5-degrees  	\par Extension Passive  	 0-5  	                0-5-degrees  	\par \par No evidence of instability in the AP plane or varus or valgus stress.  Negative  Lachman. Negative pivot shift.  Negative anterior drawer test.  Negative posterior drawer test.  Negative Roma.  Negative Apley grind.  No medial or lateral joint line tenderness.  Positive tenderness over the medial and lateral facet of the patella.  Positive patellofemoral crepitations.  No lateral tilting patella.  No patellar apprehension.  Positive crepitation in the medial and lateral femoral condyle.  No proximal or distal swelling, edema or tenderness.  No gross motor or sensory deficits.  Mild intra-articular swelling.  No varus or valgus malalignment.  2+ DP and PT pulses.  Skin is intact.  No rashes, scars or lesions.  \par \par Impression: \par Osteoarthritis of the right knee\par Osteoarthritis of the left knee \par \par Consent:\par The risks and benefits of the procedure were discussed with the patient in detail.  Upon verbal consent of the patient, we proceeded with the Orthovisc injections as noted below.  \par \par Procedure: \par After sterile prep, the patient underwent an Orthovisc injection of 30 mg of Sodium Hyaluronate in a 2 mL syringe into the right and left knee.  The patient tolerated the procedure well.  There were no complications.   \par \par NDC#:  [address]-12\par Lot#:    [id number]  \par Exp. Date:  08/31/2023\par \par NDC#:  [address]-21 \par Lot#:    [id number]\par Exp Date:  06/30/2023\par \par Plan: \par At this time, I have recommended ice and elevation.  The patient will be reassessed in one week for the next Orthovisc injection for the osteoarthritis of the right and left knee.\par

## 2021-10-12 ENCOUNTER — APPOINTMENT (OUTPATIENT)
Dept: ORTHOPEDIC SURGERY | Facility: CLINIC | Age: 46
End: 2021-10-12
Payer: COMMERCIAL

## 2021-10-12 PROCEDURE — 20610 DRAIN/INJ JOINT/BURSA W/O US: CPT | Mod: 50

## 2021-10-13 NOTE — PROCEDURE
[de-identified] : \par Physical Examination\par Right Knee:\par Knee: Range of Motion in Degrees  	\par    	                 Claimant:  	Normal:  	\par Flexion Active  	 135     	                135-degrees  	\par Flexion Passive  	 135  	                135-degrees  	\par Extension Active  	 0-5  	                0-5-degrees  	\par Extension Passive  	 0-5  	                0-5-degrees  	\par \par No weakness to flexion/extension.  No evidence of instability in the AP plane or varus or valgus stress.  Negative  Lachman. Negative pivot shift.  Negative anterior drawer test.  Negative posterior drawer test.  Negative Roma.  Negative Apley grind.  No medial or lateral joint line tenderness.  Positive tenderness over the medial and lateral facet of the patella.  Positive patellofemoral crepitations.  No lateral tilting patella.  No patellar apprehension.  Positive crepitation in the medial and lateral femoral condyle.  No proximal or distal swelling, edema or tenderness.  No gross motor or sensory deficits.  Mild intra-articular swelling.  No varus or valgus malalignment.  2+ DP and PT pulses.  Skin is intact.  No rashes, scars or lesions.  \par \par Left Knee:\par Knee: Range of Motion in Degrees  	\par    	                 Claimant:  	Normal:  	\par Flexion Active  	 135     	                135-degrees  	\par Flexion Passive  	 135  	                135-degrees  	\par Extension Active  	 0-5  	                0-5-degrees  	\par Extension Passive  	 0-5  	                0-5-degrees  	\par \par No weakness to flexion/extension.  No evidence of instability in the AP plane or varus or valgus stress.  Negative  Lachman. Negative pivot shift.  Negative anterior drawer test.  Negative posterior drawer test.  Negative Roma.  Negative Apley grind.  No medial or lateral joint line tenderness.  Positive tenderness over the medial and lateral facet of the patella.  Positive patellofemoral crepitations.  No lateral tilting patella.  No patellar apprehension.  Positive crepitation in the medial and lateral femoral condyle.  No proximal or distal swelling, edema or tenderness.  No gross motor or sensory deficits.  Mild intra-articular swelling.  No varus or valgus malalignment.  2+ DP and PT pulses.  Skin is intact.  No rashes, scars or lesions.  \par \par Impression: \par Osteoarthritis of the right knee\par Osteoarthritis of the left knee \par \par Consent:\par The risks and benefits of the procedure were discussed with the patient in detail.  Upon verbal consent of the patient, we proceeded with the Orthovisc injections as noted below.  \par \par Procedure: \par After sterile prep, the patient underwent an Orthovisc injection of 30 mg of Sodium Hyaluronate in a 2 mL syringe into the right and left knee.  The patient tolerated the procedure well.  There were no complications.   \par \par NDC#:  [address]-53\par Lot#:    [id number]\par Exp. Date:  06/30/2023\par \par NDC#:  [address]-18\par Lot#:    [id number]\par Exp Date:  08/31/2023\par \par Plan: \par At this time, I have recommended ice and elevation.  The patient will be reassessed in one week for the next Orthovisc injection for the osteoarthritis of the right and left knee.\par

## 2021-10-13 NOTE — ADDENDUM
[FreeTextEntry1] : This note was written by Loco Mckeon on 10/13/2021, acting as a scribe for NATO BASURTO, AMADOR/L, PA

## 2021-10-15 ENCOUNTER — APPOINTMENT (OUTPATIENT)
Dept: ORTHOPEDIC SURGERY | Facility: CLINIC | Age: 46
End: 2021-10-15

## 2021-10-19 ENCOUNTER — APPOINTMENT (OUTPATIENT)
Dept: ORTHOPEDIC SURGERY | Facility: CLINIC | Age: 46
End: 2021-10-19
Payer: COMMERCIAL

## 2021-10-19 PROCEDURE — 20610 DRAIN/INJ JOINT/BURSA W/O US: CPT | Mod: 50

## 2021-10-21 NOTE — PROCEDURE
[de-identified] : Physical Examination\par Right Knee:\par Knee: Range of Motion in Degrees  	\par    	                 Claimant:  	Normal:  	\par Flexion Active  	 135     	                135-degrees  	\par Flexion Passive  	 135  	                135-degrees  	\par Extension Active  	 0-5  	                0-5-degrees  	\par Extension Passive  	 0-5  	                0-5-degrees  	\par \par No weakness to flexion/extension.  No evidence of instability in the AP plane or varus or valgus stress.  Negative  Lachman. Negative pivot shift.  Negative anterior drawer test.  Negative posterior drawer test.  Negative Roma.  Negative Apley grind.  No medial or lateral joint line tenderness.  Positive tenderness over the medial and lateral facet of the patella.  Positive patellofemoral crepitations.  No lateral tilting patella.  No patellar apprehension.  Positive crepitation in the medial and lateral femoral condyle.  No proximal or distal swelling, edema or tenderness.  No gross motor or sensory deficits.  Mild intra-articular swelling.  No varus or valgus malalignment.  2+ DP and PT pulses.  Skin is intact.  No rashes, scars or lesions.  \par \par Left Knee:\par Knee: Range of Motion in Degrees  	\par    	                 Claimant:  	Normal:  	\par Flexion Active  	 135     	                135-degrees  	\par Flexion Passive  	 135  	                135-degrees  	\par Extension Active  	 0-5  	                0-5-degrees  	\par Extension Passive  	 0-5  	                0-5-degrees  	\par \par No weakness to flexion/extension.  No evidence of instability in the AP plane or varus or valgus stress.  Negative  Lachman. Negative pivot shift.  Negative anterior drawer test.  Negative posterior drawer test.  Negative Roma.  Negative Apley grind.  No medial or lateral joint line tenderness.  Positive tenderness over the medial and lateral facet of the patella.  Positive patellofemoral crepitations.  No lateral tilting patella.  No patellar apprehension.  Positive crepitation in the medial and lateral femoral condyle.  No proximal or distal swelling, edema or tenderness.  No gross motor or sensory deficits.  Mild intra-articular swelling.  No varus or valgus malalignment.  2+ DP and PT pulses.  Skin is intact.  No rashes, scars or lesions.  \par \par Impression: \par Osteoarthritis of the right knee\par Osteoarthritis of the left knee \par \par Consent:\par The risks and benefits of the procedure were discussed with the patient in detail.  Upon verbal consent of the patient, we proceeded with the Orthovisc injections as noted below.  \par \par Procedure: \par After sterile prep, the patient underwent an Orthovisc injection of 30 mg of Sodium Hyaluronate in a 2 mL syringe into the right and left knee.  The patient tolerated the procedures well.  There were no complications.   \par \par NDC#:  [address]-83\par Lot#:    [id number]\par Exp. Date:  06/30/23\par \par NDC#:  [address]-97\par Lot#:    [id number]\par Exp Date:  08/31/23\par \par Plan: \par At this time I have recommended ice and elevation.  The patient will be reassessed in six to eight weeks for the osteoarthritis of the right and left knee.\par

## 2021-10-21 NOTE — ADDENDUM
[FreeTextEntry1] : This note was written by Adela Moore on 10/21/2021 acting as scribe for Lizet Myles, SOFIAR/CHELSEY, PA.\par

## 2022-09-16 ENCOUNTER — APPOINTMENT (OUTPATIENT)
Dept: ORTHOPEDIC SURGERY | Facility: CLINIC | Age: 47
End: 2022-09-16

## 2022-09-16 PROCEDURE — 99214 OFFICE O/P EST MOD 30 MIN: CPT

## 2022-09-16 PROCEDURE — 73560 X-RAY EXAM OF KNEE 1 OR 2: CPT | Mod: RT

## 2022-09-16 RX ORDER — HYALURONATE SODIUM 30 MG/2 ML
30 SYRINGE (ML) INTRAARTICULAR
Qty: 8 | Refills: 0 | Status: ACTIVE | OUTPATIENT
Start: 2022-09-16

## 2022-09-20 NOTE — ADDENDUM
[FreeTextEntry1] : This note was written by Eun Mckeon on 09/20/2022 acting as scribe for Lizet Myles, OTR/L, PA

## 2022-09-20 NOTE — DISCUSSION/SUMMARY
[de-identified] : At this time, due to osteoarthritis of bilateral knees, we will order Orthovisc for both knees and when they come in, she will start the injection series.

## 2022-09-20 NOTE — PHYSICAL EXAM
[de-identified] : Right Knee: \par Range of Motion in Degrees	\par 	                  Claimant:	Normal:	\par Flexion Active	  135 	                135-degrees	\par Flexion Passive	  135	                135-degrees	\par Extension Active	  0-5	                0-5-degrees	\par Extension Passive	  0-5	                0-5-degrees	\par \par No weakness to flexion/extension.  No evidence of instability in the AP plane or varus or valgus stress.  Negative  Lachman.  Negative pivot shift.  Negative anterior drawer test.  Negative posterior drawer test.  Negative Roma.  Negative Apley grind.  No medial or lateral joint line tenderness.  Positive tenderness over the medial and lateral facet of the patella.  Positive patellofemoral crepitations.  No lateral tilting patella.  No patella apprehension.  Positive crepitation in the medial and lateral femoral condyle.  No proximal or distal swelling, edema or tenderness.  No gross motor or sensory deficits.  Mild intra-articular swelling.  2+ DP and PT pulses.  No varus or valgus malalignment.  Skin is intact.  No rashes, scars or lesions. \par \par Left Knee: \par Range of Motion in Degrees	\par 	                  Claimant:	Normal:	\par Flexion Active	  135 	                135-degrees	\par Flexion Passive	  135	                135-degrees	\par Extension Active	  0-5	                0-5-degrees	\par Extension Passive	  0-5	                0-5-degrees	\par \par No weakness to flexion/extension.  No evidence of instability in the AP plane or varus or valgus stress.  Negative  Lachman.  Negative pivot shift.  Negative anterior drawer test.  Negative posterior drawer test.  Negative Roma.  Negative Apley grind.  No medial or lateral joint line tenderness.  Positive tenderness over the medial and lateral facet of the patella.  Positive patellofemoral crepitations.  No lateral tilting patella.  No patella apprehension.  Positive crepitation in the medial and lateral femoral condyle.  No proximal or distal swelling, edema or tenderness.  No gross motor or sensory deficits.  Mild intra-articular swelling.  2+ DP and PT pulses.  No varus or valgus malalignment.  Skin is intact.  No rashes, scars or lesions. \par  [de-identified] : Ambulating with a slightly antalgic to antalgic gait.  Station:  Normal.  [de-identified] : Appearance:  Well-developed, well-nourished female in no acute distress.\par   [de-identified] : Radiographs, which were taken today, one-two views of the right knee, one-two views of the left knee, including AP Standing, reveals moderate osteoarthritis of bilateral knees.

## 2022-09-28 ENCOUNTER — APPOINTMENT (OUTPATIENT)
Dept: ORTHOPEDIC SURGERY | Facility: CLINIC | Age: 47
End: 2022-09-28

## 2022-09-28 ENCOUNTER — NON-APPOINTMENT (OUTPATIENT)
Age: 47
End: 2022-09-28

## 2022-09-28 DIAGNOSIS — M72.2 PLANTAR FASCIAL FIBROMATOSIS: ICD-10-CM

## 2022-09-28 PROCEDURE — 99214 OFFICE O/P EST MOD 30 MIN: CPT

## 2022-09-28 PROCEDURE — 99072 ADDL SUPL MATRL&STAF TM PHE: CPT

## 2022-09-28 PROCEDURE — 73630 X-RAY EXAM OF FOOT: CPT | Mod: 50

## 2022-09-28 NOTE — PHYSICAL EXAM
[de-identified] : General: Alert and oriented x3. In no acute distress. Pleasant in nature with a normal affect. No apparent respiratory distress.\par \par Left Foot Exam\par Skin: Clean, dry, intact\par Inspection: No obvious malalignment, no masses, no swelling, no effusion\par Pulses: 2+ DP/PT pulses\par ROM: FOOT Full  ROM of digits, ANKLE 10 degrees of dorsiflexion, 40 degrees of plantarflexion, 10 degrees of subtalar motion.\par Painful ROM: None\par Tenderness: No tenderness over the medial malleolus, No tenderness over the lateral malleolus, no CFL/ATFL/PTFL pain, no deltoid ligament pain. No heel pain. No Achilles tenderness. No 5th metatarsal pain. No pain to the LisFranc joint. No ttp over the posterior tibial tendon.\par Stability: Negative anterior/posterior drawer.\par Strength: 5/5 ADD/ABD/TA/GS/EHL/FHL/EDL\par Neuro: Sensation in tact to light touch throughout\par Additional tests: Negative Mortons test, negative tarsal tunnel tinels, negative single heel rise.	\par \par Right Foot Exam\par Skin: Clean, dry, intact\par Inspection: No obvious malalignment, no masses, no swelling, no effusion\par Pulses: 2+ DP/PT pulses\par ROM: FOOT Full  ROM of digits, ANKLE 10 degrees of dorsiflexion, 40 degrees of plantarflexion, 10 degrees of subtalar motion.\par Painful ROM: None\par Tenderness: No tenderness over the medial malleolus, No tenderness over the lateral malleolus, no CFL/ATFL/PTFL pain, no deltoid ligament pain. No heel pain. No Achilles tenderness. No 5th metatarsal pain. No pain to the LisFranc joint. No ttp over the posterior tibial tendon.\par Stability: Negative anterior/posterior drawer.\par Strength: 5/5 ADD/ABD/TA/GS/EHL/FHL/EDL\par Neuro: Sensation in tact to light touch throughout\par Additional tests: Negative Mortons test, negative tarsal tunnel tinels, negative single heel rise.			 [de-identified] : X-rays of the bilateral foot were ordered, obtained, and reviewed by me today, 09/28/2022, revealed: No acute fractures. Spurring noted. \par

## 2022-09-28 NOTE — REASON FOR VISIT
[Initial Visit] : an initial visit for [FreeTextEntry2] : bilateral heel pain, left greater than right

## 2022-09-28 NOTE — HISTORY OF PRESENT ILLNESS
[FreeTextEntry1] : The patient is a 46 year old female presenting for an initial evaluation of acute on chronic bilateral foot pain since May 2022. The pain is stated to be greater in her left foot. The patient cannot attribute their pain to any injury, fall, or trauma. She reports pain localized to both of her heels, worsened with walking, weightbearing, and standing. 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 The patient has tried gel heel cups as well as a frozen water bottle. She performs a home exercise program for this, though the patient reports that she does not perform the exercises too often. No formal PT. She describes cramping sensations to both of her feet. She presents wearing sneakers and is walking without assistance. No other complaints at this time.

## 2022-09-28 NOTE — DISCUSSION/SUMMARY
[de-identified] : Assessment: Bilateral Foot Plantar Fasciitis\par \par Plan:\par #1. Anti-inflammatories/Tylenol as needed for pain. I recommend that the patient utilize Voltaren gel topically. If the Voltaren gel could not be obtained, Icy Hot, Biofreeze, or Bengay can be utilized instead.		\par #2. Home stretching program/physical therapy prescription given.\par #3. Dr. Keller's insoles/ gel heel cups.\par #4. Epsom Salt Baths daily\par #5. Dorsal Night Splint. Use as instructed/as directed.\par #6. I advised the patient to utilize a frozen water bottle or golf ball as a foot roller/massager.		\par #7. All Questions answered. The patient understood the treatment plan above. Follow up in [facility] for re-evaluation.\par

## 2022-09-28 NOTE — ADDENDUM
[FreeTextEntry1] : I, Eleanor Velarde, acted solely as a scribe for Dr. Jai Khan on this date 09/28/2022.\par \par All medical record entries made by the Scribe were at my, Dr. Jai Khan, direction and personally dictated by me on 09/28/2022. I have reviewed the chart and agree that the record accurately reflects my personal performance of the history, physical exam, assessment and plan. I have also personally directed, reviewed, and agreed with the chart.	\par

## 2022-10-07 ENCOUNTER — APPOINTMENT (OUTPATIENT)
Dept: ORTHOPEDIC SURGERY | Facility: CLINIC | Age: 47
End: 2022-10-07

## 2022-10-07 PROCEDURE — 99072 ADDL SUPL MATRL&STAF TM PHE: CPT

## 2022-10-07 PROCEDURE — 20610 DRAIN/INJ JOINT/BURSA W/O US: CPT | Mod: 50

## 2022-10-11 NOTE — ADDENDUM
[FreeTextEntry1] : This note was written by Eun Mckeon on 10/11/2022 acting as scribe for Lizet Myles, OTR/L, PA

## 2022-10-11 NOTE — PROCEDURE
[de-identified] : Indications: Osteoarthritis of the bilateral knees\par Consent:\par The risks and benefits of the procedures were discussed with the patient in detail.  Upon verbal consent of the patient, we proceeded with the Supartz injection as noted below.  \par \par Description of Procedure:\par After sterile prep, the patient underwent a Supartz injection of 25 mg of Sodium Hyaluronate in a 2.5 mL syringe into the right and left knee.  The patient tolerated the procedures well.  There were no complications.  \par \par :  [employer]\par NDC #:              [address]-0\par Lot #:                [id number]\par Expiration:        10/31/2024\par \par :  [employer]\par NDC #:              [address]-7\par Lot #:                [id number]\par Expiration:        Unspecified\par \par Plan:\par I have recommended ice and elevation.  The patient will be reassessed in one week for the next Supartz injection for the left and right knee osteoarthritis.   \par \par

## 2022-10-11 NOTE — PHYSICAL EXAM
[de-identified] : Right Knee: \par Range of Motion in Degrees	\par 	  Claimant:	Normal:	\par Flexion Active	 135 	 135-degrees	\par Flexion Passive	 135	 135-degrees	\par Extension Active	 0-5	 0-5-degrees	\par Extension Passive	 0-5	 0-5-degrees	\par \par No weakness to flexion/extension. No evidence of instability in the AP plane or varus or valgus stress. Negative Lachman. Negative pivot shift. Negative anterior drawer test. Negative posterior drawer test. Negative Roma. Negative Apley grind. No medial or lateral joint line tenderness. Positive tenderness over the medial and lateral facet of the patella. Positive patellofemoral crepitations. No lateral tilting patella. No patella apprehension. Positive crepitation in the medial and lateral femoral condyle. No proximal or distal swelling, edema or tenderness. No gross motor or sensory deficits. Mild intra-articular swelling. 2+ DP and PT pulses. No varus or valgus malalignment. Skin is intact. No rashes, scars or lesions. \par \par Left Knee: \par Range of Motion in Degrees	\par 	  Claimant:	Normal:	\par Flexion Active	 135 	 135-degrees	\par Flexion Passive	 135	 135-degrees	\par Extension Active	 0-5	 0-5-degrees	\par Extension Passive	 0-5	 0-5-degrees	\par \par No weakness to flexion/extension. No evidence of instability in the AP plane or varus or valgus stress. Negative Lachman. Negative pivot shift. Negative anterior drawer test. Negative posterior drawer test. Negative Roma. Negative Apley grind. No medial or lateral joint line tenderness. Positive tenderness over the medial and lateral facet of the patella. Positive patellofemoral crepitations. No lateral tilting patella. No patella apprehension. Positive crepitation in the medial and lateral femoral condyle. No proximal or distal swelling, edema or tenderness. No gross motor or sensory deficits. Mild intra-articular swelling. 2+ DP and PT pulses. No varus or valgus malalignment. Skin is intact. No rashes, scars or lesions. \par

## 2022-10-14 ENCOUNTER — APPOINTMENT (OUTPATIENT)
Dept: ORTHOPEDIC SURGERY | Facility: CLINIC | Age: 47
End: 2022-10-14

## 2022-10-14 PROCEDURE — 99072 ADDL SUPL MATRL&STAF TM PHE: CPT

## 2022-10-14 PROCEDURE — 20610 DRAIN/INJ JOINT/BURSA W/O US: CPT | Mod: 50

## 2022-10-17 NOTE — ADDENDUM
[FreeTextEntry1] : This note was written by Margareth Bansal on 10/17/2022 acting as scribe for Lizet Myles, OTR/L, PA

## 2022-10-17 NOTE — PROCEDURE
[de-identified] : Indications: \par Osteoarthritis of the bilateral knees\par \par Consent:\par The risks and benefits of the procedures were discussed with the patient in detail.  Upon verbal consent of the patient, we proceeded with the Supartz injections as noted below.  \par \par Description of Procedure:\par After sterile prep, the patient underwent a Supartz injection of 25 mg of Sodium Hyaluronate in a 2.5 mL syringe into the right and left knee.  The patient tolerated the procedures well.  There were no complications.  \par \par :  [employer]\par NDC #:              [address]-5\par Lot #:                [id number]\par Expiration:        Unspecified\par \par Plan:\par I have recommended ice and elevation.  The patient will be reassessed in one week for the next Supartz injection for the left and right knee osteoarthritis.   \par \par

## 2022-10-17 NOTE — PHYSICAL EXAM
[de-identified] : Right Knee: \par Knee:  Range of Motion in Degrees	\par 	  Claimant:	Normal:	\par Flexion Active	 135 	 135-degrees	\par Flexion Passive	 135	 135-degrees	\par Extension Active	 0-5	 0-5-degrees	\par Extension Passive	 0-5	 0-5-degrees	\par \par No weakness to flexion/extension. No evidence of instability in the AP plane or varus or valgus stress. Negative Lachman. Negative pivot shift. Negative anterior drawer test. Negative posterior drawer test. Negative Roma. Negative Apley grind. No medial or lateral joint line tenderness. Positive tenderness over the medial and lateral facet of the patella. Positive patellofemoral crepitations. No lateral tilting patella. No patella apprehension. Positive crepitation in the medial and lateral femoral condyle. No proximal or distal swelling, edema or tenderness. No gross motor or sensory deficits. Mild intra-articular swelling. 2+ DP and PT pulses. No varus or valgus malalignment. Skin is intact. No rashes, scars or lesions. \par \par Left Knee: \par Knee:  Range of Motion in Degrees	\par 	  Claimant:	Normal:	\par Flexion Active	 135 	 135-degrees	\par Flexion Passive	 135	 135-degrees	\par Extension Active	 0-5	 0-5-degrees	\par Extension Passive	 0-5	 0-5-degrees	\par \par No weakness to flexion/extension. No evidence of instability in the AP plane or varus or valgus stress. Negative Lachman. Negative pivot shift. Negative anterior drawer test. Negative posterior drawer test. Negative Roma. Negative Apley grind. No medial or lateral joint line tenderness. Positive tenderness over the medial and lateral facet of the patella. Positive patellofemoral crepitations. No lateral tilting patella. No patella apprehension. Positive crepitation in the medial and lateral femoral condyle. No proximal or distal swelling, edema or tenderness. No gross motor or sensory deficits. Mild intra-articular swelling. 2+ DP and PT pulses. No varus or valgus malalignment. Skin is intact. No rashes, scars or lesions.

## 2022-10-25 ENCOUNTER — APPOINTMENT (OUTPATIENT)
Dept: ORTHOPEDIC SURGERY | Facility: CLINIC | Age: 47
End: 2022-10-25

## 2022-10-25 PROCEDURE — 20610 DRAIN/INJ JOINT/BURSA W/O US: CPT | Mod: 50

## 2022-10-25 PROCEDURE — 99072 ADDL SUPL MATRL&STAF TM PHE: CPT

## 2022-10-26 NOTE — PROCEDURE
[de-identified] : Consent:\par The risks and benefits of the procedures were discussed with the patient in detail.  Upon verbal consent of the patient, we proceeded with the Supartz injections as noted below.  \par \par Indications: Osteoarthritis, bilateral knees\par Description of Procedure:\par After sterile prep, the patient underwent a Supartz injection of 25 mg of sodium hyaluronate in a 2.5 mL syringe into the right and left knee.  The patient tolerated the procedures well.  There were no complications.  \par \par :  [employer]\par NDC #: [address]-9\par Lot #:   [id number]\par Expiration:   11/30/23\par \par Plan:\par I have recommended ice and elevation.  The patient will be reassessed in one week for the next Supartz injection for the left and right knee osteoarthritis.   \par \par

## 2022-10-26 NOTE — ADDENDUM
[FreeTextEntry1] : This note was written by Adela Moore on 10/26/2022 acting as scribe for Lizet Myles, SOFIAR/CHELSEY, PA.\par

## 2022-10-26 NOTE — PHYSICAL EXAM
[de-identified] : Right Knee: \par Knee:  Range of Motion in Degrees	\par 	  Claimant:	Normal:	\par Flexion Active	 135 	 135-degrees	\par Flexion Passive	 135	 135-degrees	\par Extension Active	 0-5	 0-5-degrees	\par Extension Passive	 0-5	 0-5-degrees	\par \par No weakness to flexion/extension. No evidence of instability in the AP plane or varus or valgus stress. Negative Lachman. Negative pivot shift. Negative anterior drawer test. Negative posterior drawer test. Negative Roma. Negative Apley grind. No medial or lateral joint line tenderness. Positive tenderness over the medial and lateral facet of the patella. Positive patellofemoral crepitations. No lateral tilting patella. No patella apprehension. Positive crepitation in the medial and lateral femoral condyle. No proximal or distal swelling, edema or tenderness. No gross motor or sensory deficits. Mild intra-articular swelling. 2+ DP and PT pulses. No varus or valgus malalignment. Skin is intact. No rashes, scars or lesions. \par \par Left Knee: \par Knee:  Range of Motion in Degrees	\par 	  Claimant:	Normal:	\par Flexion Active	 135 	 135-degrees	\par Flexion Passive	 135	 135-degrees	\par Extension Active	 0-5	 0-5-degrees	\par Extension Passive	 0-5	 0-5-degrees	\par \par No weakness to flexion/extension. No evidence of instability in the AP plane or varus or valgus stress. Negative Lachman. Negative pivot shift. Negative anterior drawer test. Negative posterior drawer test. Negative Roma. Negative Apley grind. No medial or lateral joint line tenderness. Positive tenderness over the medial and lateral facet of the patella. Positive patellofemoral crepitations. No lateral tilting patella. No patella apprehension. Positive crepitation in the medial and lateral femoral condyle. No proximal or distal swelling, edema or tenderness. No gross motor or sensory deficits. Mild intra-articular swelling. 2+ DP and PT pulses. No varus or valgus malalignment. Skin is intact. No rashes, scars or lesions.

## 2022-11-04 ENCOUNTER — APPOINTMENT (OUTPATIENT)
Dept: ORTHOPEDIC SURGERY | Facility: CLINIC | Age: 47
End: 2022-11-04

## 2022-11-04 PROCEDURE — 99072 ADDL SUPL MATRL&STAF TM PHE: CPT

## 2022-11-04 PROCEDURE — 20610 DRAIN/INJ JOINT/BURSA W/O US: CPT | Mod: 50

## 2022-11-07 NOTE — ADDENDUM
[FreeTextEntry1] : This note was written by Loco Mckeon on 11/07/2022, acting as a scribe for NATO BASURTO, AMADOR/L, PA

## 2022-11-07 NOTE — PHYSICAL EXAM
[de-identified] : Right Knee: Range of Motion in Degrees	\par 	                  Claimant:	Normal:	\par Flexion Active	  135 	                135-degrees	\par Flexion Passive	  135	                135-degrees	\par Extension Active	  0-5	                0-5-degrees	\par Extension Passive	  0-5	                0-5-degrees	\par \par No weakness to flexion/extension.  No evidence of instability in the AP plane or varus or valgus stress.  Negative  Lachman.  Negative pivot shift.  Negative anterior drawer test.  Negative posterior drawer test.  Negative Roma.  Negative Apley grind.  No medial or lateral joint line tenderness.  Positive tenderness over the medial and lateral facet of the patella.  Positive patellofemoral crepitations.  No lateral tilting patella.  No patella apprehension.  Positive crepitation in the medial and lateral femoral condyle.  No proximal or distal swelling, edema or tenderness.  No gross motor or sensory deficits.  Mild intra-articular swelling.  2+ DP and PT pulses.  No varus or valgus malalignment.  Skin is intact.  No rashes, scars or lesions.  \par \par Left Knee: Range of Motion in Degrees	\par 	                  Claimant:	Normal:	\par Flexion Active	  135 	                135-degrees	\par Flexion Passive	  135	                135-degrees	\par Extension Active	  0-5	                0-5-degrees	\par Extension Passive	  0-5	                0-5-degrees	\par \par No weakness to flexion/extension.  No evidence of instability in the AP plane or varus or valgus stress.  Negative  Lachman.  Negative pivot shift.  Negative anterior drawer test.  Negative posterior drawer test.  Negative Roma.  Negative Apley grind.  No medial or lateral joint line tenderness.  Positive tenderness over the medial and lateral facet of the patella.  Positive patellofemoral crepitations.  No lateral tilting patella.  No patella apprehension.  Positive crepitation in the medial and lateral femoral condyle.  No proximal or distal swelling, edema or tenderness.  No gross motor or sensory deficits.  Mild intra-articular swelling.  2+ DP and PT pulses.  No varus or valgus malalignment.  Skin is intact.  No rashes, scars or lesions. \par

## 2022-11-07 NOTE — PROCEDURE
[de-identified] : \par Indications:\par Osteoarthritis of the right knee\par Osteoarthritis of the left knee\par \par Consent:\par The risks and benefits of the procedure were discussed with the patient in detail.  Upon verbal consent of the patient, we proceeded with the Supartz injections as noted below.  \par \par Description of Procedure:\par After sterile prep, the patient underwent a Supartz injection of 25 mg of Sodium Hyaluronate in a 2.5 mL syringe into the right and left knee.  The patient tolerated the procedures well.  There were no complications.  \par \par :  [employer]\par NDC#:  [address]-60\par Lot#:    [id number]\par Expiration Date:   11/30/25\par \par :  [employer]\par NDC#:  [address]-97\par Lot#:    [id number] Expiration Date:   11/30/25\par \par Plan:\par I have recommended ice and elevation.  The patient will be reassessed in one week for the next Supartz injection for the right and left knee osteoarthritis. \par

## 2022-11-15 ENCOUNTER — APPOINTMENT (OUTPATIENT)
Dept: ORTHOPEDIC SURGERY | Facility: CLINIC | Age: 47
End: 2022-11-15

## 2022-11-15 PROCEDURE — 20610 DRAIN/INJ JOINT/BURSA W/O US: CPT | Mod: 50

## 2022-11-15 PROCEDURE — 99072 ADDL SUPL MATRL&STAF TM PHE: CPT

## 2022-11-16 NOTE — ADDENDUM
[FreeTextEntry1] : This note was written by Adela Moore on 11/16/2022 acting as scribe for Lizet Myles, SOFIAR/CHELSEY, PA.\par

## 2022-11-16 NOTE — PHYSICAL EXAM
[de-identified] : Right Knee: Range of Motion in Degrees	\par 	                  Claimant:	Normal:	\par Flexion Active	  135 	                135-degrees	\par Flexion Passive	  135	                135-degrees	\par Extension Active	  0-5	                0-5-degrees	\par Extension Passive	  0-5	                0-5-degrees	\par \par No weakness to flexion/extension.  No evidence of instability in the AP plane or varus or valgus stress.  Negative  Lachman.  Negative pivot shift.  Negative anterior drawer test.  Negative posterior drawer test.  Negative Roma.  Negative Apley grind.  No medial or lateral joint line tenderness.  Positive tenderness over the medial and lateral facet of the patella.  Positive patellofemoral crepitations.  No lateral tilting patella.  No patella apprehension.  Positive crepitation in the medial and lateral femoral condyle.  No proximal or distal swelling, edema or tenderness.  No gross motor or sensory deficits.  Mild intra-articular swelling.  2+ DP and PT pulses.  No varus or valgus malalignment.  Skin is intact.  No rashes, scars or lesions.  \par \par Left Knee: Range of Motion in Degrees	\par 	                  Claimant:	Normal:	\par Flexion Active	  135 	                135-degrees	\par Flexion Passive	  135	                135-degrees	\par Extension Active	  0-5	                0-5-degrees	\par Extension Passive	  0-5	                0-5-degrees	\par \par No weakness to flexion/extension.  No evidence of instability in the AP plane or varus or valgus stress.  Negative  Lachman.  Negative pivot shift.  Negative anterior drawer test.  Negative posterior drawer test.  Negative Roma.  Negative Apley grind.  No medial or lateral joint line tenderness.  Positive tenderness over the medial and lateral facet of the patella.  Positive patellofemoral crepitations.  No lateral tilting patella.  No patella apprehension.  Positive crepitation in the medial and lateral femoral condyle.  No proximal or distal swelling, edema or tenderness.  No gross motor or sensory deficits.  Mild intra-articular swelling.  2+ DP and PT pulses.  No varus or valgus malalignment.  Skin is intact.  No rashes, scars or lesions. \par

## 2023-03-01 ENCOUNTER — APPOINTMENT (OUTPATIENT)
Dept: ORTHOPEDIC SURGERY | Facility: CLINIC | Age: 48
End: 2023-03-01
Payer: COMMERCIAL

## 2023-03-01 VITALS — HEIGHT: 71 IN | BODY MASS INDEX: 29.12 KG/M2 | WEIGHT: 208 LBS

## 2023-03-01 DIAGNOSIS — M17.12 UNILATERAL PRIMARY OSTEOARTHRITIS, LEFT KNEE: ICD-10-CM

## 2023-03-01 PROCEDURE — 99072 ADDL SUPL MATRL&STAF TM PHE: CPT

## 2023-03-01 PROCEDURE — 73560 X-RAY EXAM OF KNEE 1 OR 2: CPT | Mod: LT

## 2023-03-01 PROCEDURE — 99213 OFFICE O/P EST LOW 20 MIN: CPT

## 2023-03-02 NOTE — ADDENDUM
[FreeTextEntry1] : This note was written by Margareth Bansal on 03/02/2023 acting as scribe for Lizet Myles, OTR/L, PA

## 2023-03-02 NOTE — DISCUSSION/SUMMARY
[de-identified] : At this time, due to osteoarthritis of the left knee, the patient was placed on Diclofenac 100 mg extended release.  She will try that for two weeks and give us a call if she is not better.

## 2023-03-02 NOTE — PHYSICAL EXAM
[de-identified] : Left Knee:\par Knee: Range of Motion in Degrees	\par 	                  Claimant:	Normal:	\par Flexion Active	  135 	                135-degrees	\par Flexion Passive	  135	                135-degrees	\par Extension Active	  0-5	                0-5-degrees	\par Extension Passive	  0-5	                0-5-degrees	\par \par No weakness to flexion/extension.  No evidence of instability in the AP plane or varus or valgus stress.  Negative  Lachman.  Negative pivot shift.  Negative anterior drawer test.  Negative posterior drawer test.  Negative Roma.  Negative Apley grind.  No medial or lateral joint line tenderness.  Positive tenderness over the medial and lateral facet of the patella.  Positive patellofemoral crepitations.  No lateral tilting patella.  No patella apprehension.  Positive crepitation in the medial and lateral femoral condyle.  No proximal or distal swelling, edema or tenderness.  No gross motor or sensory deficits.  Mild intra-articular swelling.  2+ DP and PT pulses.  No varus or valgus malalignment.  Skin is intact.  No rashes, scars or lesions.  \par   [de-identified] : Gait and Station:  Ambulating with a slightly antalgic to antalgic gait.  Station:  Normal.  [de-identified] : Appearance:  Well-developed, well-nourished female in no acute distress.\par   [de-identified] : Radiographs, one to two views of the left knee taken in the office today, reveal moderate osteoarthritis.

## 2023-05-18 ENCOUNTER — TRANSCRIPTION ENCOUNTER (OUTPATIENT)
Age: 48
End: 2023-05-18

## 2023-05-18 RX ORDER — DICLOFENAC SODIUM 100 MG/1
100 TABLET, FILM COATED, EXTENDED RELEASE ORAL
Qty: 90 | Refills: 0 | Status: ACTIVE | COMMUNITY
Start: 2023-03-01 | End: 1900-01-01

## 2023-06-16 ENCOUNTER — APPOINTMENT (OUTPATIENT)
Dept: FAMILY MEDICINE | Facility: CLINIC | Age: 48
End: 2023-06-16
Payer: COMMERCIAL

## 2023-06-16 VITALS
WEIGHT: 210 LBS | TEMPERATURE: 98.7 F | HEART RATE: 79 BPM | DIASTOLIC BLOOD PRESSURE: 80 MMHG | SYSTOLIC BLOOD PRESSURE: 130 MMHG | BODY MASS INDEX: 29.4 KG/M2 | HEIGHT: 71 IN | OXYGEN SATURATION: 99 %

## 2023-06-16 DIAGNOSIS — Z81.8 FAMILY HISTORY OF OTHER MENTAL AND BEHAVIORAL DISORDERS: ICD-10-CM

## 2023-06-16 DIAGNOSIS — Z80.0 FAMILY HISTORY OF MALIGNANT NEOPLASM OF DIGESTIVE ORGANS: ICD-10-CM

## 2023-06-16 DIAGNOSIS — Z80.41 FAMILY HISTORY OF MALIGNANT NEOPLASM OF OVARY: ICD-10-CM

## 2023-06-16 PROCEDURE — 99386 PREV VISIT NEW AGE 40-64: CPT | Mod: 25

## 2023-06-16 PROCEDURE — 36415 COLL VENOUS BLD VENIPUNCTURE: CPT

## 2023-06-16 RX ORDER — ETONOGESTREL AND ETHINYL ESTRADIOL .12; .015 MG/D; MG/D
INSERT, EXTENDED RELEASE VAGINAL
Refills: 0 | Status: DISCONTINUED | COMMUNITY
End: 2023-06-16

## 2023-06-16 NOTE — HEALTH RISK ASSESSMENT
[0] : 2) Feeling down, depressed, or hopeless: Not at all (0) [PHQ-2 Negative - No further assessment needed] : PHQ-2 Negative - No further assessment needed [Never] : Never [[id number]] : 0

## 2023-06-16 NOTE — PLAN
[FreeTextEntry1] : Continue all medications as prescribed. Check labs as above (ate bran buds and great grains cereal several hours ago). Will adjust any medications based upon lab results.\par \par Reviewed age-appropriate preventive screening tests with patient. UTD on gyn exam and mammogram screening. She is due for CRC screening and will schedule for near future. She plans to do baseline colonoscopy in near future with Dr. Stewart who treats her other family members\par \par She defers on ECG today. Showed me ECG tracing on her Fit bit and was wnl. She never gets any cardiac warnings from her Fit Bit\par \par Discussed clean eating (eg Mediterranean style eating plan) and regular exercise/staying as physically active as possible. Include balance exercises and strength training and core strengthening exercises for bone health and to decrease risk for falls. \par \par Recommended Tylenol XS or Arthritis 1-2 pills BID-TID if helpful, ok to use NSAIDs sparingly with food (but revd r/b/se of NSAIDs incl CV, renal and GI and she should limit use as much as possible), regular stretching, heat/ice prn, consider turmeric supplementation, consider CBD cream or oral options, gentle yoga/chair yoga, Pilates, strengthen core muscles, consider chiro and/or massage and/or acupuncture. If these measures are not helpful enough then consider consultation with ortho and/or pain  for further eval and treatment. \par \par Reviewed importance of good self care (eg meditation, yoga, adequate rest, regular exercise, magnesium, clean eating etc).\par   \par Next CPE in 1-3 years.\par

## 2023-06-16 NOTE — REVIEW OF SYSTEMS
[Joint Pain] : joint pain [Joint Stiffness] : joint stiffness [Negative] : Heme/Lymph [Recent Change In Weight] : ~T recent weight change [Back Pain] : back pain [Fever] : no fever [Chills] : no chills [Fatigue] : no fatigue [Night Sweats] : no night sweats [Muscle Pain] : no muscle pain [FreeTextEntry2] : has lost 20# in past 2 yrs, is aiming to lose about 20 more pounds  [FreeTextEntry9] : OA related joint pain and stiffness, sees ortho and is trying to exercise/stretch/keep physically active etc, also recent low back pain flareup improving with chiro

## 2023-06-16 NOTE — PHYSICAL EXAM
[No Acute Distress] : no acute distress [Well Nourished] : well nourished [Well-Appearing] : well-appearing [Well Developed] : well developed [Normal Sclera/Conjunctiva] : normal sclera/conjunctiva [EOMI] : extraocular movements intact [Normal Outer Ear/Nose] : the outer ears and nose were normal in appearance [No JVD] : no jugular venous distention [No Lymphadenopathy] : no lymphadenopathy [Thyroid Normal, No Nodules] : the thyroid was normal and there were no nodules present [Supple] : supple [No Respiratory Distress] : no respiratory distress  [No Accessory Muscle Use] : no accessory muscle use [Normal Rate] : normal rate  [Clear to Auscultation] : lungs were clear to auscultation bilaterally [Regular Rhythm] : with a regular rhythm [Normal S1, S2] : normal S1 and S2 [No Murmur] : no murmur heard [No Carotid Bruits] : no carotid bruits [No Varicosities] : no varicosities [Pedal Pulses Present] : the pedal pulses are present [No Edema] : there was no peripheral edema [No Extremity Clubbing/Cyanosis] : no extremity clubbing/cyanosis [Soft] : abdomen soft [Non Tender] : non-tender [No Masses] : no abdominal mass palpated [Non-distended] : non-distended [No HSM] : no HSM [Normal Bowel Sounds] : normal bowel sounds [Normal Posterior Cervical Nodes] : no posterior cervical lymphadenopathy [Normal Anterior Cervical Nodes] : no anterior cervical lymphadenopathy [No Joint Swelling] : no joint swelling [Grossly Normal Strength/Tone] : grossly normal strength/tone [No Rash] : no rash [No Focal Deficits] : no focal deficits [Coordination Grossly Intact] : coordination grossly intact [Normal Gait] : normal gait [Normal Affect] : the affect was normal [Normal Insight/Judgement] : insight and judgment were intact
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
## 2023-06-16 NOTE — ASSESSMENT
[FreeTextEntry1] : DOROTEO JOHNSON is a 47 year old female here for a physical exam.  She is also here to follow up on medical issues as noted above.\par

## 2023-06-17 ENCOUNTER — TRANSCRIPTION ENCOUNTER (OUTPATIENT)
Age: 48
End: 2023-06-17

## 2023-06-17 LAB
ALBUMIN SERPL ELPH-MCNC: 4.7 G/DL
ALP BLD-CCNC: 49 U/L
ALT SERPL-CCNC: 10 U/L
ANION GAP SERPL CALC-SCNC: 15 MMOL/L
AST SERPL-CCNC: 22 U/L
BILIRUB SERPL-MCNC: 0.4 MG/DL
BUN SERPL-MCNC: 13 MG/DL
CALCIUM SERPL-MCNC: 9.5 MG/DL
CHLORIDE SERPL-SCNC: 103 MMOL/L
CHOLEST SERPL-MCNC: 176 MG/DL
CO2 SERPL-SCNC: 22 MMOL/L
CREAT SERPL-MCNC: 0.72 MG/DL
EGFR: 105 ML/MIN/1.73M2
GLUCOSE SERPL-MCNC: 81 MG/DL
HDLC SERPL-MCNC: 79 MG/DL
LDLC SERPL CALC-MCNC: 82 MG/DL
NONHDLC SERPL-MCNC: 97 MG/DL
POTASSIUM SERPL-SCNC: 4.7 MMOL/L
PROT SERPL-MCNC: 7 G/DL
SODIUM SERPL-SCNC: 139 MMOL/L
TRIGL SERPL-MCNC: 78 MG/DL
TSH SERPL-ACNC: 1.56 UIU/ML

## 2023-10-17 ENCOUNTER — APPOINTMENT (OUTPATIENT)
Dept: ORTHOPEDIC SURGERY | Facility: CLINIC | Age: 48
End: 2023-10-17
Payer: COMMERCIAL

## 2023-10-17 VITALS
HEIGHT: 71 IN | BODY MASS INDEX: 29.4 KG/M2 | HEART RATE: 93 BPM | WEIGHT: 210 LBS | SYSTOLIC BLOOD PRESSURE: 138 MMHG | DIASTOLIC BLOOD PRESSURE: 85 MMHG

## 2023-10-17 PROCEDURE — 73560 X-RAY EXAM OF KNEE 1 OR 2: CPT | Mod: 50

## 2023-10-17 PROCEDURE — 99213 OFFICE O/P EST LOW 20 MIN: CPT

## 2023-10-17 RX ORDER — HYALURONATE SODIUM 30 MG/2 ML
30 SYRINGE (ML) INTRAARTICULAR
Qty: 8 | Refills: 0 | Status: ACTIVE | OUTPATIENT
Start: 2023-10-17

## 2024-01-26 ENCOUNTER — APPOINTMENT (OUTPATIENT)
Dept: ORTHOPEDIC SURGERY | Facility: CLINIC | Age: 49
End: 2024-01-26
Payer: COMMERCIAL

## 2024-01-26 PROCEDURE — 20610 DRAIN/INJ JOINT/BURSA W/O US: CPT | Mod: 50

## 2024-01-29 NOTE — ADDENDUM
[FreeTextEntry1] : This note was written by Loco Mckeon on 01/29/2024, acting as a scribe for NATO BASURTO, AMADOR/L, PA

## 2024-01-29 NOTE — PROCEDURE
[de-identified] :  Indications:  Osteoarthritis of right knee Osteoarthritis of left knee   Consent: The risks and benefits of the procedure were discussed with the patient in detail.  Upon verbal consent of the patient, we proceeded with the Orthovisc injections as noted below.    Description of Procedure:  After sterile prep, the patient underwent an Orthovisc injection of 30 mg of Sodium Hyaluronate in a 2 mL syringe into the right and left knee.  The patient tolerated the procedures well.  There were no complications.     :  [employer] NDC#:  [address]-25 Lot#:    [id number] Expiration Date:  01/31/2026  Plan:  At this time, I have recommended ice and elevation.  The patient will be reassessed in one week for the next Orthovisc injection for the osteoarthritis of the right and left knee.

## 2024-01-29 NOTE — PHYSICAL EXAM
[de-identified] : Right Knee: Knee: Range of Motion in Degrees      Claimant: Normal:  Flexion Active  135    135-degrees  Flexion Passive  135   135-degrees  Extension Active  0-5   0-5-degrees  Extension Passive  0-5   0-5-degrees   No weakness to flexion/extension. No evidence of instability in the AP plane or varus or valgus stress. Negative Lachman. Negative pivot shift. Negative anterior drawer test. Negative posterior drawer test. Negative Roma. Negative Apley grind. No medial or lateral joint line tenderness. Positive tenderness over the medial and lateral facet of the patella. Positive patellofemoral crepitations. No lateral tilting patella. No patella apprehension. Positive crepitation in the medial and lateral femoral condyle. No proximal or distal swelling, edema or tenderness. No gross motor or sensory deficits. Mild intra-articular swelling. 2+ DP and PT pulses. No varus or valgus malalignment. Skin is intact. No rashes, scars or lesions.  Left Knee: Knee: Range of Motion in Degrees      Claimant: Normal:  Flexion Active  135    135-degrees  Flexion Passive  135   135-degrees  Extension Active  0-5   0-5-degrees  Extension Passive  0-5   0-5-degrees   No weakness to flexion/extension. No evidence of instability in the AP plane or varus or valgus stress. Negative Lachman. Negative pivot shift. Negative anterior drawer test. Negative posterior drawer test. Negative Roma. Negative Apley grind. No medial or lateral joint line tenderness. Positive tenderness over the medial and lateral facet of the patella. Positive patellofemoral crepitations. No lateral tilting patella. No patella apprehension. Positive crepitation in the medial and lateral femoral condyle. No proximal or distal swelling, edema or tenderness. No gross motor or sensory deficits. Mild intra-articular swelling. 2+ DP and PT pulses. No varus or valgus malalignment. Skin is intact. No rashes, scars or lesions.

## 2024-02-02 ENCOUNTER — APPOINTMENT (OUTPATIENT)
Dept: ORTHOPEDIC SURGERY | Facility: CLINIC | Age: 49
End: 2024-02-02
Payer: COMMERCIAL

## 2024-02-02 PROCEDURE — 20610 DRAIN/INJ JOINT/BURSA W/O US: CPT | Mod: 50

## 2024-02-09 ENCOUNTER — APPOINTMENT (OUTPATIENT)
Dept: ORTHOPEDIC SURGERY | Facility: CLINIC | Age: 49
End: 2024-02-09
Payer: COMMERCIAL

## 2024-02-09 PROCEDURE — 20610 DRAIN/INJ JOINT/BURSA W/O US: CPT | Mod: 50

## 2024-02-12 NOTE — PROCEDURE
[de-identified] : Indications: Osteoarthritis of right knee Osteoarthritis of left knee  Consent: The risks and benefits of the procedure were discussed with the patient in detail. Upon verbal consent of the patient, we proceeded with the Orthovisc injections as noted below.  Description of Procedure: After sterile prep, the patient underwent an Orthovisc injection of 30 mg of Sodium Hyaluronate in a 2 mL syringe into the right and left knee. The patient tolerated the procedures well. There were no complications.  : [employer] NDC#: [address]-69 Lot#: [id number] Expiration Date: 01/31/2026  : [employer] NDC#: [address]-90 Lot#: [id number] Expiration Date: 02/23/2026  Plan: At this time, I have recommended ice and elevation. The patient will be reassessed in one week for the next Orthovisc injection for the osteoarthritis of the right and left knee.

## 2024-02-12 NOTE — ADDENDUM
[FreeTextEntry1] : This note was written by Eun Mckeon on 02/12/2024 acting as scribe for Lizet Myles, OTR/L, PA

## 2024-02-12 NOTE — PROCEDURE
[de-identified] : Indications: Osteoarthritis of right knee Osteoarthritis of left knee  Consent: The risks and benefits of the procedure were discussed with the patient in detail. Upon verbal consent of the patient, we proceeded with the Orthovisc injections as noted below.  Description of Procedure: After sterile prep, the patient underwent an Orthovisc injection of 30 mg of Sodium Hyaluronate in a 2 mL syringe into the right and left knee. The patient tolerated the procedures well. There were no complications.  : [employer] NDC#: [address]-23 Lot#: [id number] Expiration Date: 01/31/2026  : [employer] NDC#: [address]-87 Lot#: [id number] Expiration Date: 02/23/2026  Plan: At this time, I have recommended ice and elevation. The patient will be reassessed in one week for the next Orthovisc injection for the osteoarthritis of the right and left knee.

## 2024-02-12 NOTE — PHYSICAL EXAM
[de-identified] : Right Knee: Range of Motion in Degrees Claimant: Normal: Flexion Active 135 135-degrees Flexion Passive 135 135-degrees Extension Active 0-5 0-5-degrees Extension Passive 0-5 0-5-degrees  No weakness to flexion/extension. No evidence of instability in the AP plane or varus or valgus stress. Negative Lachman. Negative pivot shift. Negative anterior drawer test. Negative posterior drawer test. Negative Roma. Negative Apley grind. No medial or lateral joint line tenderness. Positive tenderness over the medial and lateral facet of the patella. Positive patellofemoral crepitations. No lateral tilting patella. No patella apprehension. Positive crepitation in the medial and lateral femoral condyle. No proximal or distal swelling, edema or tenderness. No gross motor or sensory deficits. Mild intra-articular swelling. 2+ DP and PT pulses. No varus or valgus malalignment. Skin is intact. No rashes, scars or lesions.  Left Knee: Range of Motion in Degrees Claimant: Normal: Flexion Active 135 135-degrees Flexion Passive 135 135-degrees Extension Active 0-5 0-5-degrees Extension Passive 0-5 0-5-degrees  No weakness to flexion/extension. No evidence of instability in the AP plane or varus or valgus stress. Negative Lachman. Negative pivot shift. Negative anterior drawer test. Negative posterior drawer test. Negative Roma. Negative Apley grind. No medial or lateral joint line tenderness. Positive tenderness over the medial and lateral facet of the patella. Positive patellofemoral crepitations. No lateral tilting patella. No patella apprehension. Positive crepitation in the medial and lateral femoral condyle. No proximal or distal swelling, edema or tenderness. No gross motor or sensory deficits. Mild intra-articular swelling. 2+ DP and PT pulses. No varus or valgus malalignment. Skin is intact. No rashes, scars or lesions.

## 2024-02-12 NOTE — PHYSICAL EXAM
[de-identified] : Right Knee: Range of Motion in Degrees Claimant: Normal: Flexion Active 135 135-degrees Flexion Passive 135 135-degrees Extension Active 0-5 0-5-degrees Extension Passive 0-5 0-5-degrees  No weakness to flexion/extension. No evidence of instability in the AP plane or varus or valgus stress. Negative Lachman. Negative pivot shift. Negative anterior drawer test. Negative posterior drawer test. Negative Roma. Negative Apley grind. No medial or lateral joint line tenderness. Positive tenderness over the medial and lateral facet of the patella. Positive patellofemoral crepitations. No lateral tilting patella. No patella apprehension. Positive crepitation in the medial and lateral femoral condyle. No proximal or distal swelling, edema or tenderness. No gross motor or sensory deficits. Mild intra-articular swelling. 2+ DP and PT pulses. No varus or valgus malalignment. Skin is intact. No rashes, scars or lesions.  Left Knee: Range of Motion in Degrees Claimant: Normal: Flexion Active 135 135-degrees Flexion Passive 135 135-degrees Extension Active 0-5 0-5-degrees Extension Passive 0-5 0-5-degrees  No weakness to flexion/extension. No evidence of instability in the AP plane or varus or valgus stress. Negative Lachman. Negative pivot shift. Negative anterior drawer test. Negative posterior drawer test. Negative Roma. Negative Apley grind. No medial or lateral joint line tenderness. Positive tenderness over the medial and lateral facet of the patella. Positive patellofemoral crepitations. No lateral tilting patella. No patella apprehension. Positive crepitation in the medial and lateral femoral condyle. No proximal or distal swelling, edema or tenderness. No gross motor or sensory deficits. Mild intra-articular swelling. 2+ DP and PT pulses. No varus or valgus malalignment. Skin is intact. No rashes, scars or lesions.

## 2024-02-12 NOTE — ADDENDUM
[FreeTextEntry1] : This note was written by Eun Mckeon on 02/06/2024 acting as scribe for Lizet Myles, OTR/L, PA

## 2024-02-15 ENCOUNTER — APPOINTMENT (OUTPATIENT)
Dept: ORTHOPEDIC SURGERY | Facility: CLINIC | Age: 49
End: 2024-02-15
Payer: COMMERCIAL

## 2024-02-15 PROCEDURE — 20610 DRAIN/INJ JOINT/BURSA W/O US: CPT | Mod: 50

## 2024-02-21 NOTE — ADDENDUM
[FreeTextEntry1] : This note was written by Loco Mckeon on 02/21/2024, acting as a scribe for Srinivas Garcia III, MD

## 2024-02-21 NOTE — PROCEDURE
[de-identified] :  Indications:  Osteoarthritis of right knee Osteoarthritis of left knee   Consent: The risks and benefits of the procedure were discussed with the patient in detail.  Upon verbal consent of the patient, we proceeded with the Orthovisc injections as noted below.    Description of Procedure:  After sterile prep, the patient underwent an Orthovisc injection of 30 mg of Sodium Hyaluronate in a 2 mL syringe into the right and left knee.  The patient tolerated the procedures well.  There were no complications.     :  [employer] NDC#:  [address]-45 Lot#:  [id number]    Expiration Date:  01/31/2026  Plan:  At this time, I have recommended ice and elevation.  The patient will be reassessed in one week for the next Orthovisc injection for the osteoarthritis of the right and left knee.

## 2024-02-21 NOTE — PHYSICAL EXAM
[de-identified] : Right Knee: Range of Motion in Degrees Claimant: Normal: Flexion Active 135 135-degrees Flexion Passive 135 135-degrees Extension Active 0-5 0-5-degrees Extension Passive 0-5 0-5-degrees  No weakness to flexion/extension. No evidence of instability in the AP plane or varus or valgus stress. Negative Lachman. Negative pivot shift. Negative anterior drawer test. Negative posterior drawer test. Negative Roma. Negative Apley grind. No medial or lateral joint line tenderness. Positive tenderness over the medial and lateral facet of the patella. Positive patellofemoral crepitations. No lateral tilting patella. No patella apprehension. Positive crepitation in the medial and lateral femoral condyle. No proximal or distal swelling, edema or tenderness. No gross motor or sensory deficits. Mild intra-articular swelling. 2+ DP and PT pulses. No varus or valgus malalignment. Skin is intact. No rashes, scars or lesions.  Left Knee: Range of Motion in Degrees Claimant: Normal: Flexion Active 135 135-degrees Flexion Passive 135 135-degrees Extension Active 0-5 0-5-degrees Extension Passive 0-5 0-5-degrees  No weakness to flexion/extension. No evidence of instability in the AP plane or varus or valgus stress. Negative Lachman. Negative pivot shift. Negative anterior drawer test. Negative posterior drawer test. Negative Roma. Negative Apley grind. No medial or lateral joint line tenderness. Positive tenderness over the medial and lateral facet of the patella. Positive patellofemoral crepitations. No lateral tilting patella. No patella apprehension. Positive crepitation in the medial and lateral femoral condyle. No proximal or distal swelling, edema or tenderness. No gross motor or sensory deficits. Mild intra-articular swelling. 2+ DP and PT pulses. No varus or valgus malalignment. Skin is intact. No rashes, scars or lesions.

## 2024-05-21 ENCOUNTER — TRANSCRIPTION ENCOUNTER (OUTPATIENT)
Age: 49
End: 2024-05-21

## 2024-06-16 DIAGNOSIS — Z12.39 ENCOUNTER FOR OTHER SCREENING FOR MALIGNANT NEOPLASM OF BREAST: ICD-10-CM

## 2024-06-25 PROBLEM — M17.0 PRIMARY OSTEOARTHRITIS OF BOTH KNEES: Status: ACTIVE | Noted: 2019-04-02

## 2024-06-25 PROBLEM — Z00.00 ENCOUNTER FOR PREVENTIVE HEALTH EXAMINATION: Status: ACTIVE | Noted: 2019-03-29

## 2024-06-26 ENCOUNTER — APPOINTMENT (OUTPATIENT)
Dept: FAMILY MEDICINE | Facility: CLINIC | Age: 49
End: 2024-06-26
Payer: COMMERCIAL

## 2024-06-26 VITALS
TEMPERATURE: 97.6 F | OXYGEN SATURATION: 98 % | SYSTOLIC BLOOD PRESSURE: 122 MMHG | HEIGHT: 71 IN | WEIGHT: 217 LBS | HEART RATE: 90 BPM | BODY MASS INDEX: 30.38 KG/M2 | DIASTOLIC BLOOD PRESSURE: 68 MMHG

## 2024-06-26 DIAGNOSIS — N95.1 MENOPAUSAL AND FEMALE CLIMACTERIC STATES: ICD-10-CM

## 2024-06-26 DIAGNOSIS — M17.0 BILATERAL PRIMARY OSTEOARTHRITIS OF KNEE: ICD-10-CM

## 2024-06-26 DIAGNOSIS — K21.9 GASTRO-ESOPHAGEAL REFLUX DISEASE W/OUT ESOPHAGITIS: ICD-10-CM

## 2024-06-26 DIAGNOSIS — Z86.69 PERSONAL HISTORY OF OTHER DISEASES OF THE NERVOUS SYSTEM AND SENSE ORGANS: ICD-10-CM

## 2024-06-26 DIAGNOSIS — Z00.00 ENCOUNTER FOR GENERAL ADULT MEDICAL EXAMINATION W/OUT ABNORMAL FINDINGS: ICD-10-CM

## 2024-06-26 PROCEDURE — 99396 PREV VISIT EST AGE 40-64: CPT

## 2024-06-26 PROCEDURE — 99213 OFFICE O/P EST LOW 20 MIN: CPT | Mod: 25

## 2024-06-26 RX ORDER — OMEGA-3 FATTY ACIDS/VITAMIN E 1000 MG
CAPSULE ORAL
Refills: 0 | Status: ACTIVE | COMMUNITY

## 2024-06-26 RX ORDER — TURMERIC 95 %
POWDER (GRAM) MISCELLANEOUS
Refills: 0 | Status: ACTIVE | COMMUNITY

## 2024-06-26 RX ORDER — TOPIRAMATE 25 MG/1
25 TABLET, COATED ORAL
Refills: 0 | Status: ACTIVE | COMMUNITY

## 2024-06-26 RX ORDER — CALCIUM CARBONATE 260MG(650)
TABLET,CHEWABLE ORAL
Refills: 0 | Status: ACTIVE | COMMUNITY

## 2024-06-26 RX ORDER — BACILLUS COAGULANS/INULIN 1B-250 MG
CAPSULE ORAL
Refills: 0 | Status: ACTIVE | COMMUNITY

## 2024-06-26 RX ORDER — ACETAMINOPHEN 325 MG
TABLET ORAL
Refills: 0 | Status: ACTIVE | COMMUNITY

## 2024-06-26 RX ORDER — VITAMIN K2 90 MCG
125 MCG CAPSULE ORAL
Refills: 0 | Status: ACTIVE | COMMUNITY

## 2024-06-26 RX ORDER — OMEGA-3/DHA/EPA/FISH OIL 910-1400MG
CAPSULE ORAL
Refills: 0 | Status: ACTIVE | COMMUNITY

## 2024-06-27 LAB
ALBUMIN SERPL ELPH-MCNC: 4.6 G/DL
ALP BLD-CCNC: 48 U/L
ALT SERPL-CCNC: 15 U/L
ANION GAP SERPL CALC-SCNC: 12 MMOL/L
AST SERPL-CCNC: 16 U/L
BASOPHILS # BLD AUTO: 0.05 K/UL
BASOPHILS NFR BLD AUTO: 0.6 %
BILIRUB SERPL-MCNC: 0.4 MG/DL
BUN SERPL-MCNC: 12 MG/DL
CALCIUM SERPL-MCNC: 9.5 MG/DL
CHLORIDE SERPL-SCNC: 105 MMOL/L
CHOLEST SERPL-MCNC: 182 MG/DL
CO2 SERPL-SCNC: 21 MMOL/L
CREAT SERPL-MCNC: 0.76 MG/DL
EGFR: 98 ML/MIN/1.73M2
EOSINOPHIL # BLD AUTO: 0.31 K/UL
EOSINOPHIL NFR BLD AUTO: 3.5 %
GLUCOSE SERPL-MCNC: 93 MG/DL
HCT VFR BLD CALC: 43 %
HDLC SERPL-MCNC: 76 MG/DL
HGB BLD-MCNC: 13.2 G/DL
IMM GRANULOCYTES NFR BLD AUTO: 0.3 %
LDLC SERPL CALC-MCNC: 93 MG/DL
LYMPHOCYTES # BLD AUTO: 2.67 K/UL
LYMPHOCYTES NFR BLD AUTO: 29.8 %
MAN DIFF?: NORMAL
MCHC RBC-ENTMCNC: 29.6 PG
MCHC RBC-ENTMCNC: 30.7 GM/DL
MCV RBC AUTO: 96.4 FL
MONOCYTES # BLD AUTO: 0.78 K/UL
MONOCYTES NFR BLD AUTO: 8.7 %
NEUTROPHILS # BLD AUTO: 5.11 K/UL
NEUTROPHILS NFR BLD AUTO: 57.1 %
NONHDLC SERPL-MCNC: 106 MG/DL
PLATELET # BLD AUTO: 282 K/UL
POTASSIUM SERPL-SCNC: 4.5 MMOL/L
PROT SERPL-MCNC: 7 G/DL
RBC # BLD: 4.46 M/UL
RBC # FLD: 13.4 %
SODIUM SERPL-SCNC: 138 MMOL/L
TRIGL SERPL-MCNC: 64 MG/DL
WBC # FLD AUTO: 8.95 K/UL

## 2024-12-24 ENCOUNTER — APPOINTMENT (OUTPATIENT)
Dept: ORTHOPEDIC SURGERY | Facility: CLINIC | Age: 49
End: 2024-12-24

## 2024-12-24 DIAGNOSIS — M75.42 IMPINGEMENT SYNDROME OF LEFT SHOULDER: ICD-10-CM

## 2024-12-24 PROCEDURE — 73030 X-RAY EXAM OF SHOULDER: CPT | Mod: LT

## 2024-12-24 PROCEDURE — 20610 DRAIN/INJ JOINT/BURSA W/O US: CPT | Mod: LT

## 2024-12-24 PROCEDURE — 99214 OFFICE O/P EST MOD 30 MIN: CPT | Mod: 25

## 2025-03-11 ENCOUNTER — APPOINTMENT (OUTPATIENT)
Age: 50
End: 2025-03-11

## 2025-03-24 ENCOUNTER — NON-APPOINTMENT (OUTPATIENT)
Age: 50
End: 2025-03-24

## 2025-04-21 ENCOUNTER — NON-APPOINTMENT (OUTPATIENT)
Age: 50
End: 2025-04-21

## 2025-04-22 ENCOUNTER — APPOINTMENT (OUTPATIENT)
Dept: ORTHOPEDIC SURGERY | Facility: CLINIC | Age: 50
End: 2025-04-22
Payer: COMMERCIAL

## 2025-04-22 VITALS — WEIGHT: 224 LBS | BODY MASS INDEX: 31.36 KG/M2 | HEIGHT: 71 IN

## 2025-04-22 PROCEDURE — 73560 X-RAY EXAM OF KNEE 1 OR 2: CPT | Mod: 26,50

## 2025-04-22 PROCEDURE — 99213 OFFICE O/P EST LOW 20 MIN: CPT

## 2025-04-22 RX ORDER — HYALURONATE SODIUM 30 MG/2 ML
30 SYRINGE (ML) INTRAARTICULAR
Qty: 8 | Refills: 0 | Status: ACTIVE | OUTPATIENT
Start: 2025-04-22

## 2025-04-30 DIAGNOSIS — M17.0 BILATERAL PRIMARY OSTEOARTHRITIS OF KNEE: ICD-10-CM

## 2025-04-30 RX ORDER — HYALURONATE SODIUM 20 MG/2 ML
20 SYRINGE (ML) INTRAARTICULAR
Qty: 6 | Refills: 0 | Status: ACTIVE | OUTPATIENT
Start: 2025-04-30

## 2025-05-01 ENCOUNTER — APPOINTMENT (OUTPATIENT)
Age: 50
End: 2025-05-01

## 2025-05-01 VITALS
TEMPERATURE: 98.2 F | SYSTOLIC BLOOD PRESSURE: 141 MMHG | BODY MASS INDEX: 31.36 KG/M2 | WEIGHT: 224 LBS | HEART RATE: 82 BPM | DIASTOLIC BLOOD PRESSURE: 94 MMHG | RESPIRATION RATE: 16 BRPM | OXYGEN SATURATION: 99 % | HEIGHT: 71 IN

## 2025-05-01 DIAGNOSIS — Z00.00 ENCOUNTER FOR GENERAL ADULT MEDICAL EXAMINATION W/OUT ABNORMAL FINDINGS: ICD-10-CM

## 2025-05-01 PROCEDURE — 99204 OFFICE O/P NEW MOD 45 MIN: CPT

## 2025-05-01 PROCEDURE — 99459 PELVIC EXAMINATION: CPT

## 2025-05-01 PROCEDURE — 81025 URINE PREGNANCY TEST: CPT

## 2025-05-13 ENCOUNTER — APPOINTMENT (OUTPATIENT)
Dept: ORTHOPEDIC SURGERY | Facility: CLINIC | Age: 50
End: 2025-05-13
Payer: COMMERCIAL

## 2025-05-13 PROBLEM — R19.00 PELVIC MASS IN FEMALE: Status: ACTIVE | Noted: 2025-05-13

## 2025-05-13 PROBLEM — N92.6 IRREGULAR MENSES: Status: ACTIVE | Noted: 2025-05-13

## 2025-05-13 PROCEDURE — 20610 DRAIN/INJ JOINT/BURSA W/O US: CPT | Mod: 50

## 2025-05-14 ENCOUNTER — NON-APPOINTMENT (OUTPATIENT)
Age: 50
End: 2025-05-14

## 2025-05-15 ENCOUNTER — APPOINTMENT (OUTPATIENT)
Dept: GYNECOLOGIC ONCOLOGY | Facility: CLINIC | Age: 50
End: 2025-05-15

## 2025-05-15 VITALS
DIASTOLIC BLOOD PRESSURE: 90 MMHG | SYSTOLIC BLOOD PRESSURE: 148 MMHG | HEART RATE: 93 BPM | BODY MASS INDEX: 31.36 KG/M2 | WEIGHT: 224 LBS | OXYGEN SATURATION: 100 % | HEIGHT: 71 IN | RESPIRATION RATE: 16 BRPM

## 2025-05-15 DIAGNOSIS — G43.909 MIGRAINE, UNSPECIFIED, NOT INTRACTABLE, W/OUT STATUS MIGRAINOSUS: ICD-10-CM

## 2025-05-15 PROBLEM — N89.8 VAGINAL CYST: Status: ACTIVE | Noted: 2025-05-15

## 2025-05-15 PROCEDURE — 99204 OFFICE O/P NEW MOD 45 MIN: CPT

## 2025-05-15 PROCEDURE — 99459 PELVIC EXAMINATION: CPT

## 2025-05-20 ENCOUNTER — APPOINTMENT (OUTPATIENT)
Dept: COLORECTAL SURGERY | Facility: CLINIC | Age: 50
End: 2025-05-20
Payer: COMMERCIAL

## 2025-05-20 VITALS
BODY MASS INDEX: 31.78 KG/M2 | HEIGHT: 71 IN | WEIGHT: 227 LBS | DIASTOLIC BLOOD PRESSURE: 90 MMHG | SYSTOLIC BLOOD PRESSURE: 130 MMHG | TEMPERATURE: 97.4 F

## 2025-05-20 DIAGNOSIS — N89.8 OTHER SPECIFIED NONINFLAMMATORY DISORDERS OF VAGINA: ICD-10-CM

## 2025-05-20 DIAGNOSIS — Z86.59 PERSONAL HISTORY OF OTHER MENTAL AND BEHAVIORAL DISORDERS: ICD-10-CM

## 2025-05-20 PROCEDURE — 99205 OFFICE O/P NEW HI 60 MIN: CPT

## 2025-05-23 ENCOUNTER — APPOINTMENT (OUTPATIENT)
Dept: ORTHOPEDIC SURGERY | Facility: CLINIC | Age: 50
End: 2025-05-23
Payer: COMMERCIAL

## 2025-05-23 PROCEDURE — 20610 DRAIN/INJ JOINT/BURSA W/O US: CPT | Mod: 50

## 2025-05-30 ENCOUNTER — APPOINTMENT (OUTPATIENT)
Dept: ORTHOPEDIC SURGERY | Facility: CLINIC | Age: 50
End: 2025-05-30
Payer: COMMERCIAL

## 2025-05-30 DIAGNOSIS — M75.42 IMPINGEMENT SYNDROME OF LEFT SHOULDER: ICD-10-CM

## 2025-05-30 DIAGNOSIS — M17.0 BILATERAL PRIMARY OSTEOARTHRITIS OF KNEE: ICD-10-CM

## 2025-05-30 PROCEDURE — 20610 DRAIN/INJ JOINT/BURSA W/O US: CPT | Mod: 50

## 2025-06-10 ENCOUNTER — OUTPATIENT (OUTPATIENT)
Dept: OUTPATIENT SERVICES | Facility: HOSPITAL | Age: 50
LOS: 1 days | End: 2025-06-10
Payer: COMMERCIAL

## 2025-06-10 VITALS
DIASTOLIC BLOOD PRESSURE: 72 MMHG | TEMPERATURE: 98 F | SYSTOLIC BLOOD PRESSURE: 130 MMHG | OXYGEN SATURATION: 99 % | RESPIRATION RATE: 18 BRPM | WEIGHT: 216.49 LBS | HEART RATE: 78 BPM | HEIGHT: 70 IN

## 2025-06-10 DIAGNOSIS — Z13.89 ENCOUNTER FOR SCREENING FOR OTHER DISORDER: ICD-10-CM

## 2025-06-10 DIAGNOSIS — Z29.9 ENCOUNTER FOR PROPHYLACTIC MEASURES, UNSPECIFIED: ICD-10-CM

## 2025-06-10 DIAGNOSIS — N89.8 OTHER SPECIFIED NONINFLAMMATORY DISORDERS OF VAGINA: ICD-10-CM

## 2025-06-10 DIAGNOSIS — Z01.818 ENCOUNTER FOR OTHER PREPROCEDURAL EXAMINATION: ICD-10-CM

## 2025-06-10 DIAGNOSIS — Z98.890 OTHER SPECIFIED POSTPROCEDURAL STATES: Chronic | ICD-10-CM

## 2025-06-10 DIAGNOSIS — Z98.891 HISTORY OF UTERINE SCAR FROM PREVIOUS SURGERY: Chronic | ICD-10-CM

## 2025-06-10 LAB
A1C WITH ESTIMATED AVERAGE GLUCOSE RESULT: 5.5 % — SIGNIFICANT CHANGE UP (ref 4–5.6)
ALBUMIN SERPL ELPH-MCNC: 4.2 G/DL — SIGNIFICANT CHANGE UP (ref 3.3–5.2)
ALP SERPL-CCNC: 43 U/L — SIGNIFICANT CHANGE UP (ref 40–120)
ALT FLD-CCNC: 12 U/L — SIGNIFICANT CHANGE UP
ANION GAP SERPL CALC-SCNC: 9 MMOL/L — SIGNIFICANT CHANGE UP (ref 5–17)
APPEARANCE UR: CLEAR — SIGNIFICANT CHANGE UP
APTT BLD: 30.7 SEC — SIGNIFICANT CHANGE UP (ref 26.1–36.8)
AST SERPL-CCNC: 15 U/L — SIGNIFICANT CHANGE UP
BASOPHILS # BLD AUTO: 0.05 K/UL — SIGNIFICANT CHANGE UP (ref 0–0.2)
BASOPHILS NFR BLD AUTO: 0.5 % — SIGNIFICANT CHANGE UP (ref 0–2)
BILIRUB SERPL-MCNC: 0.4 MG/DL — SIGNIFICANT CHANGE UP (ref 0.4–2)
BILIRUB UR-MCNC: NEGATIVE — SIGNIFICANT CHANGE UP
BLD GP AB SCN SERPL QL: SIGNIFICANT CHANGE UP
BUN SERPL-MCNC: 13.3 MG/DL — SIGNIFICANT CHANGE UP (ref 8–20)
CALCIUM SERPL-MCNC: 9.2 MG/DL — SIGNIFICANT CHANGE UP (ref 8.4–10.5)
CHLORIDE SERPL-SCNC: 105 MMOL/L — SIGNIFICANT CHANGE UP (ref 96–108)
CO2 SERPL-SCNC: 24 MMOL/L — SIGNIFICANT CHANGE UP (ref 22–29)
COLOR SPEC: YELLOW — SIGNIFICANT CHANGE UP
CREAT SERPL-MCNC: 0.83 MG/DL — SIGNIFICANT CHANGE UP (ref 0.5–1.3)
DIFF PNL FLD: NEGATIVE — SIGNIFICANT CHANGE UP
EGFR: 87 ML/MIN/1.73M2 — SIGNIFICANT CHANGE UP
EGFR: 87 ML/MIN/1.73M2 — SIGNIFICANT CHANGE UP
EOSINOPHIL # BLD AUTO: 0.11 K/UL — SIGNIFICANT CHANGE UP (ref 0–0.5)
EOSINOPHIL NFR BLD AUTO: 1.2 % — SIGNIFICANT CHANGE UP (ref 0–6)
ESTIMATED AVERAGE GLUCOSE: 111 MG/DL — SIGNIFICANT CHANGE UP (ref 68–114)
GLUCOSE SERPL-MCNC: 97 MG/DL — SIGNIFICANT CHANGE UP (ref 70–99)
GLUCOSE UR QL: NEGATIVE MG/DL — SIGNIFICANT CHANGE UP
HCG UR QL: NEGATIVE — SIGNIFICANT CHANGE UP
HCT VFR BLD CALC: 40.4 % — SIGNIFICANT CHANGE UP (ref 34.5–45)
HGB BLD-MCNC: 13.3 G/DL — SIGNIFICANT CHANGE UP (ref 11.5–15.5)
IMM GRANULOCYTES # BLD AUTO: 0.03 K/UL — SIGNIFICANT CHANGE UP (ref 0–0.07)
IMM GRANULOCYTES NFR BLD AUTO: 0.3 % — SIGNIFICANT CHANGE UP (ref 0–0.9)
INR BLD: 1.1 RATIO — SIGNIFICANT CHANGE UP (ref 0.85–1.16)
KETONES UR QL: NEGATIVE MG/DL — SIGNIFICANT CHANGE UP
LEUKOCYTE ESTERASE UR-ACNC: NEGATIVE — SIGNIFICANT CHANGE UP
LYMPHOCYTES # BLD AUTO: 2.86 K/UL — SIGNIFICANT CHANGE UP (ref 1–3.3)
LYMPHOCYTES NFR BLD AUTO: 31.1 % — SIGNIFICANT CHANGE UP (ref 13–44)
MAGNESIUM SERPL-MCNC: 2.1 MG/DL — SIGNIFICANT CHANGE UP (ref 1.6–2.6)
MCHC RBC-ENTMCNC: 29.7 PG — SIGNIFICANT CHANGE UP (ref 27–34)
MCHC RBC-ENTMCNC: 32.9 G/DL — SIGNIFICANT CHANGE UP (ref 32–36)
MCV RBC AUTO: 90.2 FL — SIGNIFICANT CHANGE UP (ref 80–100)
MONOCYTES # BLD AUTO: 0.86 K/UL — SIGNIFICANT CHANGE UP (ref 0–0.9)
MONOCYTES NFR BLD AUTO: 9.3 % — SIGNIFICANT CHANGE UP (ref 2–14)
NEUTROPHILS # BLD AUTO: 5.29 K/UL — SIGNIFICANT CHANGE UP (ref 1.8–7.4)
NEUTROPHILS NFR BLD AUTO: 57.6 % — SIGNIFICANT CHANGE UP (ref 43–77)
NITRITE UR-MCNC: NEGATIVE — SIGNIFICANT CHANGE UP
NRBC # BLD AUTO: 0 K/UL — SIGNIFICANT CHANGE UP (ref 0–0)
NRBC # FLD: 0 K/UL — SIGNIFICANT CHANGE UP (ref 0–0)
NRBC BLD AUTO-RTO: 0 /100 WBCS — SIGNIFICANT CHANGE UP (ref 0–0)
PH UR: 7.5 — SIGNIFICANT CHANGE UP (ref 5–8)
PHOSPHATE SERPL-MCNC: 2.6 MG/DL — SIGNIFICANT CHANGE UP (ref 2.4–4.7)
PLATELET # BLD AUTO: 290 K/UL — SIGNIFICANT CHANGE UP (ref 150–400)
PMV BLD: 10.5 FL — SIGNIFICANT CHANGE UP (ref 7–13)
POTASSIUM SERPL-MCNC: 4.6 MMOL/L — SIGNIFICANT CHANGE UP (ref 3.5–5.3)
POTASSIUM SERPL-SCNC: 4.6 MMOL/L — SIGNIFICANT CHANGE UP (ref 3.5–5.3)
PROT SERPL-MCNC: 6.7 G/DL — SIGNIFICANT CHANGE UP (ref 6.6–8.7)
PROT UR-MCNC: NEGATIVE MG/DL — SIGNIFICANT CHANGE UP
PROTHROM AB SERPL-ACNC: 12.4 SEC — SIGNIFICANT CHANGE UP (ref 9.9–13.4)
RBC # BLD: 4.48 M/UL — SIGNIFICANT CHANGE UP (ref 3.8–5.2)
RBC # FLD: 12.8 % — SIGNIFICANT CHANGE UP (ref 10.3–14.5)
SODIUM SERPL-SCNC: 137 MMOL/L — SIGNIFICANT CHANGE UP (ref 135–145)
SP GR SPEC: 1.02 — SIGNIFICANT CHANGE UP (ref 1–1.03)
UROBILINOGEN FLD QL: 0.2 MG/DL — SIGNIFICANT CHANGE UP (ref 0.2–1)
WBC # BLD: 9.2 K/UL — SIGNIFICANT CHANGE UP (ref 3.8–10.5)
WBC # FLD AUTO: 9.2 K/UL — SIGNIFICANT CHANGE UP (ref 3.8–10.5)

## 2025-06-10 PROCEDURE — G0463: CPT

## 2025-06-10 PROCEDURE — 86900 BLOOD TYPING SEROLOGIC ABO: CPT

## 2025-06-10 PROCEDURE — 86850 RBC ANTIBODY SCREEN: CPT

## 2025-06-10 PROCEDURE — 93005 ELECTROCARDIOGRAM TRACING: CPT

## 2025-06-10 PROCEDURE — 85730 THROMBOPLASTIN TIME PARTIAL: CPT

## 2025-06-10 PROCEDURE — 81025 URINE PREGNANCY TEST: CPT

## 2025-06-10 PROCEDURE — 83735 ASSAY OF MAGNESIUM: CPT

## 2025-06-10 PROCEDURE — 84100 ASSAY OF PHOSPHORUS: CPT

## 2025-06-10 PROCEDURE — 36415 COLL VENOUS BLD VENIPUNCTURE: CPT

## 2025-06-10 PROCEDURE — 85025 COMPLETE CBC W/AUTO DIFF WBC: CPT

## 2025-06-10 PROCEDURE — 83036 HEMOGLOBIN GLYCOSYLATED A1C: CPT

## 2025-06-10 PROCEDURE — 93010 ELECTROCARDIOGRAM REPORT: CPT

## 2025-06-10 PROCEDURE — 81003 URINALYSIS AUTO W/O SCOPE: CPT

## 2025-06-10 PROCEDURE — 85610 PROTHROMBIN TIME: CPT

## 2025-06-10 PROCEDURE — 87086 URINE CULTURE/COLONY COUNT: CPT

## 2025-06-10 PROCEDURE — 86901 BLOOD TYPING SEROLOGIC RH(D): CPT

## 2025-06-10 PROCEDURE — 80053 COMPREHEN METABOLIC PANEL: CPT

## 2025-06-10 NOTE — H&P PST ADULT - PROBLEM SELECTOR PLAN 3
preop assessment, scheduled for Pelvic Exam Under Anesthesia Robotic Assisted Laparoscopic Excision of Posterior Vaginal Cyst Possible Total Laparoscopic Hysterectomy Bilateral Salpingectomy Possible Cystoscopy with Uterocatheter Insertion Possible Laparotomy with Dr Urban on 6/30/2025, pending medical optimization

## 2025-06-10 NOTE — H&P PST ADULT - NSICDXFAMILYHX_GEN_ALL_CORE_FT
FAMILY HISTORY:  Father  Still living? Unknown  FH: heart disease, Age at diagnosis: Age Unknown    Grandparent  Still living? Unknown  FH: ovarian cancer, Age at diagnosis: Age Unknown     FAMILY HISTORY:  Father  Still living? Unknown  FH: heart disease, Age at diagnosis: Age Unknown    Child  Still living? Unknown  FH: depression, Age at diagnosis: Age Unknown    Grandparent  Still living? Unknown  FH: diabetes mellitus, Age at diagnosis: Age Unknown  FH: ovarian cancer, Age at diagnosis: Age Unknown

## 2025-06-10 NOTE — H&P PST ADULT - NSICDXPASTMEDICALHX_GEN_ALL_CORE_FT
After Visit Summary   3/21/2018    Triny Castellanos    MRN: [id number]           Patient Information     Date Of Birth          2013        Visit Information        Provider Department      3/21/2018 11:40 AM Cesilia Vega MD; [facility]        Today's Diagnoses     Cough    -  1    Worms in stool        Intrinsic eczema        OME (otitis media with effusion), right        Influenza B           Follow-ups after your visit        Who to contact     If you have questions or need follow up information about today's clinic visit or your schedule please contact [facility] directly at [phone].  Normal or non-critical lab and imaging results will be communicated to you by [employer], letter or phone within 4 business days after the clinic has received the results. If you do not hear from us within 7 days, please contact the clinic through [employer] or phone. If you have a critical or abnormal lab result, we will notify you by phone as soon as possible.  Submit refill requests through [facility] or call your pharmacy and they will forward the refill request to us. Please allow 3 business days for your refill to be completed.          Additional Information About Your Visit        MyChart Information     [facility] lets you send messages to your doctor, view your test results, renew your prescriptions, schedule appointments and more. To sign up, go to [email or web address], contact your [place] clinic or call [phone] during business hours.            Care EveryWhere ID     This is your Care EveryWhere ID. This could be used by other organizations to access your [place] medical records  [id number]        Your Vitals Were     Pulse Temperature Respirations Pulse Oximetry          117 101.7  F (38.7  C) (Tympanic) 24 98%         Blood Pressure from Last 3 Encounters:   03/21/18 106/70   08/25/17 102/54   04/21/17 104/73    Weight  from Last 3 Encounters:   03/21/18 37 lb 1.6 oz (16.8 kg) (91 %)*   02/14/18 38 lb 8 oz (17.5 kg) (96 %)*   02/08/18 36 lb 8 oz (16.6 kg) (91 %)*     * Growth percentiles are based on [facility] 2-20 Years data.              We Performed the Following     Beta strep group A culture     Influenza A/B antigen     Rapid strep screen          Today's Medication Changes          These changes are accurate as of 3/21/18 11:59 PM.  If you have any questions, ask your nurse or doctor.               Start taking these medicines.        Dose/Directions    amoxicillin 400 MG/5ML suspension   Commonly known as:  AMOXIL   Used for:  OME (otitis media with effusion), right        Dose:  80 mg/kg/day   Take 8.4 mLs (672 mg) by mouth 2 times daily for 10 days   Quantity:  475 mL   Refills:  0       ibuprofen 40 MG/ML suspension   Commonly known as:  MOTRIN CHILD DROPS   Used for:  OME (otitis media with effusion), right, Influenza B        Dose:  10 mg/kg   Take 4.3 mLs (172 mg) by mouth every 6 hours as needed for moderate pain or fever   Quantity:  30 mL   Refills:  3       mebendazole 100 MG chewable tablet   Commonly known as:  VERMOX   Used for:  Worms in stool        Dose:  100 mg   Take 1 tablet (100 mg) by mouth once for 1 dose   Quantity:  1 tablet   Refills:  0       oseltamivir 6 MG/ML suspension   Commonly known as:  TAMIFLU   Used for:  Influenza B        Dose:  30 mg   Take 5 mLs (30 mg) by mouth 2 times daily for 5 days   Quantity:  50 mL   Refills:  0       triamcinolone 0.1 % ointment   Commonly known as:  KENALOG   Used for:  Intrinsic eczema        Apply topically 2 times daily for 14 days Apply to body rash twice daily on top of vanicream   Quantity:  80 g   Refills:  0            Where to get your medicines      These medications were sent to [facility] [address] [address]     Phone:  [phone]     amoxicillin 400 MG/5ML suspension     mebendazole 100 MG chewable tablet    oseltamivir 6 MG/ML suspension    triamcinolone 0.1 % ointment         Some of these will need a paper prescription and others can be bought over the counter.  Ask your nurse if you have questions.     Bring a paper prescription for each of these medications     ibuprofen 40 MG/ML suspension                Primary Care Provider Office Phone # Fax #    Cesilia Vega [phone]       [address] [facility] [address]        Equal Access to Services     MINI GOYAL : Hadii aad ku hadasho Soomaali, waaxda luqadaha, qaybta kaalmada adeegyada, waxay idiin hayaan adeeg khdomenica hernandez . So [facility] [phone].    ATENCIÓN: Si habla español, tiene a benson disposición servicios gratuitos de asistencia lingüística. Llame al [phone].    We comply with applicable federal civil rights laws and [place] laws. We do not discriminate on the basis of race, color, national origin, age, disability, sex, sexual orientation, or gender identity.            Thank you!     Thank you for choosing [facility]  for your care. Our goal is always to provide you with excellent care. Hearing back from our patients is one way we can continue to improve our services. Please take a few minutes to complete the written survey that you may receive in the mail after your visit with us. Thank you!             Your Updated Medication List - Protect others around you: Learn how to safely use, store and throw away your medicines at www.disposemymeds.org.          This list is accurate as of 3/21/18 11:59 PM.  Always use your most recent med list.                   Brand Name Dispense Instructions for use Diagnosis    * acetaminophen 32 mg/mL solution    TYLENOL    100 mL    Take 7.5 mLs (240 mg) by mouth every 4 hours as needed for fever or pain    Upper respiratory tract infection, unspecified type       * acetaminophen 32 mg/mL solution    TYLENOL    100 mL    Take  7.5 mLs (240 mg) by mouth every 4 hours as needed for fever or pain    Impetigo bullosa       albuterol (2.5 MG/3ML) 0.083% neb solution     50 vial    Take 1 vial (2.5 mg) by nebulization every 6 hours as needed for shortness of breath / dyspnea or wheezing    Flexural eczema       amoxicillin 400 MG/5ML suspension    AMOXIL    475 mL    Take 8.4 mLs (672 mg) by mouth 2 times daily for 10 days    OME (otitis media with effusion), right       emollient cream     454 g    Apply qid    Flexural eczema       hydrocortisone 2.5 % ointment     60 g    Apply to affected area bid for 7 days (once daily to face)    Xerosis cutis       ibuprofen 40 MG/ML suspension    MOTRIN CHILD DROPS    30 mL    Take 4.3 mLs (172 mg) by mouth every 6 hours as needed for moderate pain or fever    OME (otitis media with effusion), right, Influenza B       mebendazole 100 MG chewable tablet    VERMOX    1 tablet    Take 1 tablet (100 mg) by mouth once for 1 dose    Worms in stool       oseltamivir 6 MG/ML suspension    TAMIFLU    50 mL    Take 5 mLs (30 mg) by mouth 2 times daily for 5 days    Influenza B       triamcinolone 0.1 % ointment    KENALOG    80 g    Apply topically 2 times daily for 14 days Apply to body rash twice daily on top of vanicream    Intrinsic eczema       * Notice:  This list has 2 medication(s) that are the same as other medications prescribed for you. Read the directions carefully, and ask your doctor or other care provider to review them with you.       PAST MEDICAL HISTORY:  Anxiety and depression     Migraines     Other specified noninflammatory disorders of vagina      PAST MEDICAL HISTORY:  Migraines     Other specified noninflammatory disorders of vagina      PAST MEDICAL HISTORY:  Lumbar herniated disc     Migraines     OA (osteoarthritis)     Other specified noninflammatory disorders of vagina

## 2025-06-10 NOTE — H&P PST ADULT - PSYCHIATRIC
normal affect/alert and oriented x3/normal behavior negative details… normal affect/alert and oriented x3/normal behavior/anxious

## 2025-06-10 NOTE — H&P PST ADULT - HISTORY OF PRESENT ILLNESS
48 yo F  via  x1 (twins) LMP 2024, presents with c/o vaginal mass. Patient reports she had a pap smear in 2025 which was very painful for her. Patient had seen GYN Dr Calhoun on 2025 for surgical consultation and at that time mass was not visible in vagina and was not c/w Palmer's duct cyst. Per Dr Woodruff' note, case was discussed with GYN oncology and mass was felt to be in the rectovaginal septum, possibly coming from posterior cervix or uterus. Patient reports some urinary frequency and urgency as well as pelvic pain and pressure, but denies constipation. She underwent a screening colonoscopy on 3/13/2025 with findings of tubular adenomas. MRI pelvis 3/24/2025 with reports of upper vaginal cyst but without impingement upon rectum. Patient was placed on progesterone only OCP 2025 due to history of migraines and has had amenorrhea since. Scheduled for Pelvic Exam Under Anesthesia Robotic Assisted Laparoscopic Excision of Posterior Vaginal Cyst Possible Total Laparoscopic Hysterectomy Bilateral Salpingectomy Possible Cystoscopy Uterocatheter Insertion Possible Laparotomy with Dr Urban on 2025, pending medical optimization  ?  Results/Data:    US 3/6/2025 AV uterus, large collection that appears to be coming from the cervix extending into the uterus, displacing the endo. This collection is filled with echoes and measures approximately 6.4 x 6.2 x 6.0 cm. No color flow noted. Endo 8mm with fluid within; walls appear thin, R ovary not seen at this time, L ovary 3.2 cm cyst noted within.  ?  MRI 3/24/2025 with large (7.1 x 6.8 x 5.5 cm) hemorrhagic lesion in the upper vagina could represent a hemorrhagic Palmer's duct cyst versus a large pocket of blood. The more downstream vagina is normal in appearance. The cervical canal is filled with blood products but is otherwise normal in appearance. R ovary normal size and appearance. L ovary Normal size and appearance, demonstrates a 4 cm simple cyst.  ?  Health Maintenance:    Lmammo: 3/24/2025, WNL  Lcolonoscopy: 3/12/2025, repeat colonoscopy 3-5 years  L pap 2025 NILM HPV negative  ? 48 yo F  via  x1 (twins) LMP 2024, presents with c/o vaginal mass. Patient reports she had a pap smear in 2025 which was very painful for her. Patient had seen GYN Dr Calhoun on 2025 for surgical consultation and at that time mass was not visible in vagina and was not c/w Palmer's duct cyst. Per Dr Woodruff' note, case was discussed with GYN oncology and mass was felt to be in the rectovaginal septum, possibly coming from posterior cervix or uterus. Patient reports some urinary frequency and urgency as well as pelvic pain and pressure, but denies constipation. She underwent a screening colonoscopy on 3/13/2025 with findings of tubular adenomas. MRI pelvis 3/24/2025 with reports of upper vaginal cyst but without impingement upon rectum. Patient was placed on progesterone only OCP 2024 due to history of migraines and has had amenorrhea since. Scheduled for Pelvic Exam Under Anesthesia Robotic Assisted Laparoscopic Excision of Posterior Vaginal Cyst Possible Total Laparoscopic Hysterectomy Bilateral Salpingectomy Possible Cystoscopy Uterocatheter Insertion Possible Laparotomy with Dr Urban on 2025, pending medical optimization  ?  Results/Data:    US 3/6/2025 AV uterus, large collection that appears to be coming from the cervix extending into the uterus, displacing the endo. This collection is filled with echoes and measures approximately 6.4 x 6.2 x 6.0 cm. No color flow noted. Endo 8mm with fluid within; walls appear thin, R ovary not seen at this time, L ovary 3.2 cm cyst noted within.  ?  MRI 3/24/2025 with large (7.1 x 6.8 x 5.5 cm) hemorrhagic lesion in the upper vagina could represent a hemorrhagic Palmer's duct cyst versus a large pocket of blood. The more downstream vagina is normal in appearance. The cervical canal is filled with blood products but is otherwise normal in appearance. R ovary normal size and appearance. L ovary Normal size and appearance, demonstrates a 4 cm simple cyst.  ?  Health Maintenance:    Lmammo: 3/24/2025, WNL  Lcolonoscopy: 3/12/2025, repeat colonoscopy 3-5 years  L pap 2025 NILM HPV negative  ?

## 2025-06-10 NOTE — H&P PST ADULT - GEN GEN HX ROS MEA POS PC
43 y/o female with PMHx of Anemia (s/p iron infusion 12/23/19), Asthma -no meds (no h/o of exacerbation >5 years ), Morbid Obesity (s/p Vertical gastrectomy 12/2017), fatigue 43 y/o female with PMHx of Anemia (s/p iron infusion 12/23/19), Asthma -no meds (no h/o of exacerbation >5 years ), Morbid Obesity (s/p Vertical gastrectomy 12/2017), hypothyroid, GERD, c/o menorrhagia. Diagnostic imaging revealed myoma, adenomyosis, and possible endometriosis. Evaluated by Dr. Howard. Presents to [facility] today for a scheduled total laparoscopic hysterectomy, bilateral salpingectomy, cystoscopy on 12/30/2019.

## 2025-06-10 NOTE — H&P PST ADULT - NEGATIVE GASTROINTESTINAL SYMPTOMS
no nausea/no vomiting/no change in bowel habits/no abdominal pain/no melena/no hematochezia no nausea/no vomiting/no diarrhea/no constipation/no change in bowel habits/no abdominal pain/no melena/no hematochezia

## 2025-06-10 NOTE — H&P PST ADULT - ASSESSMENT
48 yo F  via  x1 (twins) LMP 2024, presents with c/o vaginal mass. Patient reports she had a pap smear in 2025 which was very painful for her. Patient had seen GYN Dr Calhoun on 2025 for surgical consultation and at that time mass was not visible in vagina and was not c/w Palmer's duct cyst. Per Dr Woodruff' note, case was discussed with GYN oncology and mass was felt to be in the rectovaginal septum, possibly coming from posterior cervix or uterus. Patient reports some urinary frequency and urgency as well as pelvic pain and pressure, but denies constipation. She underwent a screening colonoscopy on 3/13/2025 with findings of tubular adenomas. MRI pelvis 3/24/2025 with reports of upper vaginal cyst but without impingement upon rectum. Patient was placed on progesterone only OCP 2025 due to history of migraines and has had amenorrhea since. Scheduled for Pelvic Exam Under Anesthesia Robotic Assisted Laparoscopic Excision of Posterior Vaginal Cyst Possible Total Laparoscopic Hysterectomy Bilateral Salpingectomy Possible Cystoscopy with Uterocatheter Insertion Possible Laparotomy with Dr Urban on 2025, pending medical optimization    Written and oral ERP instructions given, pt verbalized understanding.     Patient was educated on preop preparation with written and verbal instructions. Pt was informed to obtain clearances >3 days before surgery. Pt will review medications with PCP. Pt was educated on NSAIDs, multivitamins and herbals that increase the risk of bleeding and need to be stopped 7 days before procedure. Bowel prep instructions given by Dr Urban's office. Pt verbalized understanding of the above.     OPIOID RISK TOOL    MARGRET EACH BOX THAT APPLIES AND ADD TOTALS AT THE END    FAMILY HISTORY OF SUBSTANCE ABUSE                 FEMALE         MALE                                                Alcohol                             [  ]1 pt          [  ]3pts                                               Illegal Durgs                     [  ]2 pts        [  ]3pts                                               Rx Drugs                           [  ]4 pts        [  ]4 pts    PERSONAL HISTORY OF SUBSTANCE ABUSE                                                                                          Alcohol                             [  ]3 pts       [  ]3 pts                                               Illegal Drugs                     [  ]4 pts        [  ]4 pts                                               Rx Drugs                           [  ]5 pts        [  ]5 pts    AGE BETWEEN 16-45 YEARS                                      [  ]1 pt         [  ]1 pt    HISTORY OF PREADOLESCENT   SEXUAL ABUSE                                                             [  ]3 pts        [  ]0pts    PSYCHOLOGICAL DISEASE                     ADD, OCD, Bipolar, Schizophrenia        [  ]2 pts         [  ]2 pts                      Depression                                               [  x]1 pt           [  ]1 pt           SCORING TOTAL   (add numbers and type here)              ( 1 )                                     A score of 3 or lower indicated LOW risk for future opioid abuse  A score of 4 to 7 indicated moderate risk for future opioid abuse  A score of 8 or higher indicates a high risk for opioid abuse    CAPRINI VTE 2.0 SCORE [CLOT updated 2019]    AGE RELATED RISK FACTORS                                                       MOBILITY RELATED FACTORS  [x] Age 41-60 years                                            (1 Point)                    [ ] Bed rest                                                        (1 Point)  [ ] Age: 61-74 years                                           (2 Points)                  [ ] Plaster cast                                                   (2 Points)  [ ] Age= 75 years                                              (3 Points)                    [ ] Bed bound for more than 72 hours                 (2 Points)    DISEASE RELATED RISK FACTORS                                               GENDER SPECIFIC FACTORS  [ ] Edema in the lower extremities                       (1 Point)              [ ] Pregnancy                                                     (1 Point)  [ ] Varicose veins                                               (1 Point)                     [ ] Post-partum < 6 weeks                                   (1 Point)             [ x BMI > 25 Kg/m2                                            (1 Point)                     [ ] Hormonal therapy  or oral contraception          (1 Point)                 [ ] Sepsis (in the previous month)                        (1 Point)               [ ] History of pregnancy complications                 (1 point)  [ ] Pneumonia or serious lung disease                                               [ ] Unexplained or recurrent                     (1 Point)           (in the previous month)                               (1 Point)  [ ] Abnormal pulmonary function test                     (1 Point)                 SURGERY RELATED RISK FACTORS  [ ] Acute myocardial infarction                              (1 Point)               [ ]  Section                                             (1 Point)  [ ] Congestive heart failure (in the previous month)  (1 Point)      [ ] Minor surgery                                                  (1 Point)   [ ] Inflammatory bowel disease                             (1 Point)               [ ] Arthroscopic surgery                                        (2 Points)  [ ] Central venous access                                      (2 Points)                [x] General surgery lasting more than 45 minutes (2 points)  [ ] Malignancy- Present or previous                   (2 Points)                [ ] Elective arthroplasty                                         (5 points)    [ ] Stroke (in the previous month)                          (5 Points)                                                                                                                                                           HEMATOLOGY RELATED FACTORS                                                 TRAUMA RELATED RISK FACTORS  [ ] Prior episodes of VTE                                     (3 Points)                [ ] Fracture of the hip, pelvis, or leg                       (5 Points)  [ ] Positive family history for VTE                         (3 Points)             [ ] Acute spinal cord injury (in the previous month)  (5 Points)  [ ] Prothrombin 46261 A                                     (3 Points)               [ ] Paralysis  (less than 1 month)                             (5 Points)  [ ] Factor V Leiden                                             (3 Points)                  [ ] Multiple Trauma within 1 month                        (5 Points)  [ ] Lupus anticoagulants                                     (3 Points)                                                           [ ] Anticardiolipin antibodies                               (3 Points)                                                       [ ] High homocysteine in the blood                      (3 Points)                                             [ ] Other congenital or acquired thrombophilia      (3 Points)                                                [ ] Heparin induced thrombocytopenia                  (3 Points)                                     Total Score [      4   ] 50 yo F  via  x1 (twins) LMP 2024, presents with c/o vaginal mass. Patient reports she had a pap smear in 2025 which was very painful for her. Patient had seen GYN Dr Calhoun on 2025 for surgical consultation and at that time mass was not visible in vagina and was not c/w Palmer's duct cyst. Per Dr Woodruff' note, case was discussed with GYN oncology and mass was felt to be in the rectovaginal septum, possibly coming from posterior cervix or uterus. Patient reports some urinary frequency and urgency as well as pelvic pain and pressure, but denies constipation. She underwent a screening colonoscopy on 3/13/2025 with findings of tubular adenomas. MRI pelvis 3/24/2025 with reports of upper vaginal cyst but without impingement upon rectum. Patient was placed on progesterone only OCP 2024 due to history of migraines and has had amenorrhea since. Scheduled for Pelvic Exam Under Anesthesia Robotic Assisted Laparoscopic Excision of Posterior Vaginal Cyst Possible Total Laparoscopic Hysterectomy Bilateral Salpingectomy Possible Cystoscopy Uterocatheter Insertion Possible Laparotomy with Dr Urban on 2025, pending medical optimization    Written and oral ERP instructions given, pt verbalized understanding.     Patient was educated on preop preparation with written and verbal instructions. Pt was informed to obtain clearances >3 days before surgery. Pt will review medications with PCP. Pt was educated on NSAIDs, multivitamins and herbals that increase the risk of bleeding and need to be stopped 7 days before procedure. Bowel prep instructions given by Dr Urban's office. Pt verbalized understanding of the above.     OPIOID RISK TOOL    MARGRET EACH BOX THAT APPLIES AND ADD TOTALS AT THE END    FAMILY HISTORY OF SUBSTANCE ABUSE                 FEMALE         MALE                                                Alcohol                             [  ]1 pt          [  ]3pts                                               Illegal Drugs                     [  ]2 pts        [  ]3pts                                               Rx Drugs                           [  ]4 pts        [  ]4 pts    PERSONAL HISTORY OF SUBSTANCE ABUSE                                                                                          Alcohol                             [  ]3 pts       [  ]3 pts                                               Illegal Drugs                     [  ]4 pts        [  ]4 pts                                               Rx Drugs                           [  ]5 pts        [  ]5 pts    AGE BETWEEN 16-45 YEARS                                      [  ]1 pt         [  ]1 pt    HISTORY OF PREADOLESCENT   SEXUAL ABUSE                                                             [  ]3 pts        [  ]0pts    PSYCHOLOGICAL DISEASE                     ADD, OCD, Bipolar, Schizophrenia        [  ]2 pts         [  ]2 pts                      Depression                                               [  x]1 pt           [  ]1 pt           SCORING TOTAL   (add numbers and type here)              ( 1 )                                     A score of 3 or lower indicated LOW risk for future opioid abuse  A score of 4 to 7 indicated moderate risk for future opioid abuse  A score of 8 or higher indicates a high risk for opioid abuse    CAPRINI VTE 2.0 SCORE [CLOT updated 2019]    AGE RELATED RISK FACTORS                                                       MOBILITY RELATED FACTORS  [x] Age 41-60 years                                            (1 Point)                    [ ] Bed rest                                                        (1 Point)  [ ] Age: 61-74 years                                           (2 Points)                  [ ] Plaster cast                                                   (2 Points)  [ ] Age= 75 years                                              (3 Points)                    [ ] Bed bound for more than 72 hours                 (2 Points)    DISEASE RELATED RISK FACTORS                                               GENDER SPECIFIC FACTORS  [ ] Edema in the lower extremities                       (1 Point)              [ ] Pregnancy                                                     (1 Point)  [ ] Varicose veins                                               (1 Point)                     [ ] Post-partum < 6 weeks                                   (1 Point)             [ x BMI > 25 Kg/m2                                            (1 Point)                     [ ] Hormonal therapy  or oral contraception          (1 Point)                 [ ] Sepsis (in the previous month)                        (1 Point)               [ ] History of pregnancy complications                 (1 point)  [ ] Pneumonia or serious lung disease                                               [ ] Unexplained or recurrent                     (1 Point)           (in the previous month)                               (1 Point)  [ ] Abnormal pulmonary function test                     (1 Point)                 SURGERY RELATED RISK FACTORS  [ ] Acute myocardial infarction                              (1 Point)               [ ]  Section                                             (1 Point)  [ ] Congestive heart failure (in the previous month)  (1 Point)      [ ] Minor surgery                                                  (1 Point)   [ ] Inflammatory bowel disease                             (1 Point)               [ ] Arthroscopic surgery                                        (2 Points)  [ ] Central venous access                                      (2 Points)                [x] General surgery lasting more than 45 minutes (2 points)  [ ] Malignancy- Present or previous                   (2 Points)                [ ] Elective arthroplasty                                         (5 points)    [ ] Stroke (in the previous month)                          (5 Points)                                                                                                                                                           HEMATOLOGY RELATED FACTORS                                                 TRAUMA RELATED RISK FACTORS  [ ] Prior episodes of VTE                                     (3 Points)                [ ] Fracture of the hip, pelvis, or leg                       (5 Points)  [ ] Positive family history for VTE                         (3 Points)             [ ] Acute spinal cord injury (in the previous month)  (5 Points)  [ ] Prothrombin 54967 A                                     (3 Points)               [ ] Paralysis  (less than 1 month)                             (5 Points)  [ ] Factor V Leiden                                             (3 Points)                  [ ] Multiple Trauma within 1 month                        (5 Points)  [ ] Lupus anticoagulants                                     (3 Points)                                                           [ ] Anticardiolipin antibodies                               (3 Points)                                                       [ ] High homocysteine in the blood                      (3 Points)                                             [ ] Other congenital or acquired thrombophilia      (3 Points)                                                [ ] Heparin induced thrombocytopenia                  (3 Points)                                     Total Score [      4   ]

## 2025-06-11 ENCOUNTER — APPOINTMENT (OUTPATIENT)
Dept: FAMILY MEDICINE | Facility: CLINIC | Age: 50
End: 2025-06-11
Payer: COMMERCIAL

## 2025-06-11 VITALS
OXYGEN SATURATION: 99 % | BODY MASS INDEX: 31.36 KG/M2 | DIASTOLIC BLOOD PRESSURE: 82 MMHG | HEART RATE: 86 BPM | SYSTOLIC BLOOD PRESSURE: 122 MMHG | WEIGHT: 224 LBS | HEIGHT: 71 IN | TEMPERATURE: 97.8 F

## 2025-06-11 PROBLEM — Z01.818 PREOPERATIVE CLEARANCE: Status: ACTIVE | Noted: 2025-06-11

## 2025-06-11 LAB
CULTURE RESULTS: SIGNIFICANT CHANGE UP
SPECIMEN SOURCE: SIGNIFICANT CHANGE UP

## 2025-06-11 PROCEDURE — 99213 OFFICE O/P EST LOW 20 MIN: CPT

## 2025-06-11 RX ORDER — SUMATRIPTAN 100 MG/1
100 TABLET, FILM COATED ORAL
Refills: 0 | Status: ACTIVE | COMMUNITY

## 2025-06-18 ENCOUNTER — APPOINTMENT (OUTPATIENT)
Dept: GYNECOLOGIC ONCOLOGY | Facility: CLINIC | Age: 50
End: 2025-06-18

## 2025-06-18 VITALS — WEIGHT: 222 LBS | HEIGHT: 71 IN | BODY MASS INDEX: 31.08 KG/M2

## 2025-06-18 PROCEDURE — 99214 OFFICE O/P EST MOD 30 MIN: CPT

## 2025-06-18 PROCEDURE — G2211 COMPLEX E/M VISIT ADD ON: CPT | Mod: NC

## 2025-06-18 PROCEDURE — 99459 PELVIC EXAMINATION: CPT

## 2025-06-27 ENCOUNTER — APPOINTMENT (OUTPATIENT)
Dept: FAMILY MEDICINE | Facility: CLINIC | Age: 50
End: 2025-06-27
Payer: COMMERCIAL

## 2025-06-27 VITALS
OXYGEN SATURATION: 99 % | DIASTOLIC BLOOD PRESSURE: 72 MMHG | SYSTOLIC BLOOD PRESSURE: 132 MMHG | TEMPERATURE: 97.4 F | HEIGHT: 71 IN | WEIGHT: 221 LBS | BODY MASS INDEX: 30.94 KG/M2 | HEART RATE: 80 BPM

## 2025-06-27 PROBLEM — Z86.59 HISTORY OF DEPRESSION: Status: ACTIVE | Noted: 2025-06-27

## 2025-06-27 PROBLEM — Z82.49 FAMILY HISTORY OF HYPERTENSION: Status: ACTIVE | Noted: 2025-06-27

## 2025-06-27 PROCEDURE — 36415 COLL VENOUS BLD VENIPUNCTURE: CPT

## 2025-06-27 PROCEDURE — 99396 PREV VISIT EST AGE 40-64: CPT

## 2025-06-27 PROCEDURE — 99213 OFFICE O/P EST LOW 20 MIN: CPT | Mod: 25

## 2025-06-27 NOTE — ASU PATIENT PROFILE, ADULT - NSICDXPASTMEDICALHX_GEN_ALL_CORE_FT
PAST MEDICAL HISTORY:  Lumbar herniated disc     Migraines     OA (osteoarthritis)     Other specified noninflammatory disorders of vagina

## 2025-06-28 ENCOUNTER — TRANSCRIPTION ENCOUNTER (OUTPATIENT)
Age: 50
End: 2025-06-28

## 2025-06-28 LAB
CHOLEST SERPL-MCNC: 175 MG/DL
HDLC SERPL-MCNC: 67 MG/DL
LDLC SERPL-MCNC: 98 MG/DL
NONHDLC SERPL-MCNC: 109 MG/DL
TRIGL SERPL-MCNC: 54 MG/DL

## 2025-06-30 ENCOUNTER — RESULT REVIEW (OUTPATIENT)
Age: 50
End: 2025-06-30

## 2025-06-30 ENCOUNTER — TRANSCRIPTION ENCOUNTER (OUTPATIENT)
Age: 50
End: 2025-06-30

## 2025-06-30 ENCOUNTER — OUTPATIENT (OUTPATIENT)
Dept: INPATIENT UNIT | Facility: HOSPITAL | Age: 50
LOS: 1 days | End: 2025-06-30
Payer: COMMERCIAL

## 2025-06-30 VITALS
SYSTOLIC BLOOD PRESSURE: 128 MMHG | OXYGEN SATURATION: 100 % | DIASTOLIC BLOOD PRESSURE: 92 MMHG | HEIGHT: 70 IN | RESPIRATION RATE: 20 BRPM | WEIGHT: 216.49 LBS | HEART RATE: 73 BPM | TEMPERATURE: 98 F

## 2025-06-30 VITALS
OXYGEN SATURATION: 100 % | HEART RATE: 70 BPM | SYSTOLIC BLOOD PRESSURE: 136 MMHG | RESPIRATION RATE: 10 BRPM | DIASTOLIC BLOOD PRESSURE: 77 MMHG

## 2025-06-30 DIAGNOSIS — Z98.890 OTHER SPECIFIED POSTPROCEDURAL STATES: Chronic | ICD-10-CM

## 2025-06-30 DIAGNOSIS — Z98.891 HISTORY OF UTERINE SCAR FROM PREVIOUS SURGERY: Chronic | ICD-10-CM

## 2025-06-30 DIAGNOSIS — N89.8 OTHER SPECIFIED NONINFLAMMATORY DISORDERS OF VAGINA: ICD-10-CM

## 2025-06-30 LAB — BLD GP AB SCN SERPL QL: SIGNIFICANT CHANGE UP

## 2025-06-30 PROCEDURE — 88305 TISSUE EXAM BY PATHOLOGIST: CPT

## 2025-06-30 PROCEDURE — 57106 VAGNC PRTL RMVL VAG WALL: CPT

## 2025-06-30 PROCEDURE — S2900: CPT

## 2025-06-30 PROCEDURE — 58999 UNLISTED PX FML GENITAL SYS: CPT

## 2025-06-30 PROCEDURE — 57106 VAGNC PRTL RMVL VAG WALL: CPT | Mod: 80

## 2025-06-30 PROCEDURE — 88305 TISSUE EXAM BY PATHOLOGIST: CPT | Mod: 26

## 2025-06-30 PROCEDURE — 58662 LAPAROSCOPY EXCISE LESIONS: CPT | Mod: 80

## 2025-06-30 PROCEDURE — 58662 LAPAROSCOPY EXCISE LESIONS: CPT

## 2025-06-30 PROCEDURE — 36415 COLL VENOUS BLD VENIPUNCTURE: CPT

## 2025-06-30 PROCEDURE — 86900 BLOOD TYPING SEROLOGIC ABO: CPT

## 2025-06-30 PROCEDURE — 86901 BLOOD TYPING SEROLOGIC RH(D): CPT

## 2025-06-30 PROCEDURE — 88307 TISSUE EXAM BY PATHOLOGIST: CPT

## 2025-06-30 PROCEDURE — C9399: CPT

## 2025-06-30 PROCEDURE — 58571 TLH W/T/O 250 G OR LESS: CPT

## 2025-06-30 PROCEDURE — 86850 RBC ANTIBODY SCREEN: CPT

## 2025-06-30 PROCEDURE — 58571 TLH W/T/O 250 G OR LESS: CPT | Mod: 80

## 2025-06-30 PROCEDURE — 88307 TISSUE EXAM BY PATHOLOGIST: CPT | Mod: 26

## 2025-06-30 RX ORDER — TOPIRAMATE 25 MG/1
1 TABLET, FILM COATED ORAL
Refills: 0 | DISCHARGE

## 2025-06-30 RX ORDER — ACETAMINOPHEN 500 MG/5ML
1000 LIQUID (ML) ORAL ONCE
Refills: 0 | Status: COMPLETED | OUTPATIENT
Start: 2025-06-30 | End: 2025-06-30

## 2025-06-30 RX ORDER — OXYCODONE HYDROCHLORIDE 30 MG/1
5 TABLET ORAL ONCE
Refills: 0 | Status: DISCONTINUED | OUTPATIENT
Start: 2025-06-30 | End: 2025-06-30

## 2025-06-30 RX ORDER — SODIUM CHLORIDE 9 G/1000ML
1000 INJECTION, SOLUTION INTRAVENOUS
Refills: 0 | Status: DISCONTINUED | OUTPATIENT
Start: 2025-06-30 | End: 2025-06-30

## 2025-06-30 RX ORDER — CEFAZOLIN SODIUM IN 0.9 % NACL 3 G/100 ML
2000 INTRAVENOUS SOLUTION, PIGGYBACK (ML) INTRAVENOUS ONCE
Refills: 0 | Status: DISCONTINUED | OUTPATIENT
Start: 2025-06-30 | End: 2025-06-30

## 2025-06-30 RX ORDER — OMEGA-3-ACID ETHYL ESTERS CAPSULES 1 G/1
0 CAPSULE, LIQUID FILLED ORAL
Refills: 0 | DISCHARGE

## 2025-06-30 RX ORDER — CELECOXIB 50 MG/1
400 CAPSULE ORAL ONCE
Refills: 0 | Status: COMPLETED | OUTPATIENT
Start: 2025-06-30 | End: 2025-06-30

## 2025-06-30 RX ORDER — FENTANYL CITRATE-0.9 % NACL/PF 100MCG/2ML
50 SYRINGE (ML) INTRAVENOUS
Refills: 0 | Status: DISCONTINUED | OUTPATIENT
Start: 2025-06-30 | End: 2025-06-30

## 2025-06-30 RX ORDER — SUMATRIPTAN 100 MG/1
1.5 TABLET, FILM COATED ORAL
Refills: 0 | DISCHARGE

## 2025-06-30 RX ORDER — NAPROXEN SODIUM 275 MG
1 TABLET ORAL
Qty: 10 | Refills: 0
Start: 2025-06-30 | End: 2025-07-04

## 2025-06-30 RX ORDER — ONDANSETRON HCL/PF 4 MG/2 ML
4 VIAL (ML) INJECTION ONCE
Refills: 0 | Status: COMPLETED | OUTPATIENT
Start: 2025-06-30 | End: 2025-06-30

## 2025-06-30 RX ORDER — BIOTIN 10 MG
0 TABLET ORAL
Refills: 0 | DISCHARGE

## 2025-06-30 RX ORDER — METRONIDAZOLE 250 MG
500 TABLET ORAL ONCE
Refills: 0 | Status: DISCONTINUED | OUTPATIENT
Start: 2025-06-30 | End: 2025-06-30

## 2025-06-30 RX ORDER — ACETAMINOPHEN 500 MG/5ML
2 LIQUID (ML) ORAL
Qty: 40 | Refills: 0
Start: 2025-06-30 | End: 2025-07-04

## 2025-06-30 RX ORDER — NORETHINDRONE 0.35 MG/1
1 TABLET ORAL
Refills: 0 | DISCHARGE

## 2025-06-30 RX ORDER — ACETAMINOPHEN 500 MG/5ML
975 LIQUID (ML) ORAL ONCE
Refills: 0 | Status: COMPLETED | OUTPATIENT
Start: 2025-06-30 | End: 2025-06-30

## 2025-06-30 RX ADMIN — CELECOXIB 400 MILLIGRAM(S): 50 CAPSULE ORAL at 06:30

## 2025-06-30 RX ADMIN — Medication 400 MILLIGRAM(S): at 14:30

## 2025-06-30 RX ADMIN — Medication 50 MICROGRAM(S): at 13:16

## 2025-06-30 RX ADMIN — Medication 4 MILLIGRAM(S): at 14:09

## 2025-06-30 RX ADMIN — Medication 975 MILLIGRAM(S): at 06:30

## 2025-06-30 RX ADMIN — Medication 3 MILLILITER(S): at 07:00

## 2025-06-30 NOTE — BRIEF OPERATIVE NOTE - OPERATION/FINDINGS
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 Cyst also appreciated with rectal exam, ballotable.  Laparoscopy: Veress entry at midline. 4x 8mm robotic trochars and 1x 5mm Air Seal. Upper abdomen unremarkable. Pelvis with dense adhesions at site of  scar in vesicouterine fold. Also evidence of diffuse endometriosis, with fluffy implants covering bilateral ovaries, bilateral pelvic sidewalls, and pouch of Maxi. Also 5-6 cm right ovarian cyst. Plevic peritoneum stripped. Right ovarian cystectomy performed, contained simple-appearing yellow serous fluid. RA-TLH/BS performed without issue, including lysis of adhesions. Specimen included extensive vaginal dissection of large cyst below uterus, filled w hemorrhagic contents c/w endometriosis. Cuff closed with 9in 0 V-loc in running fashion. Copious irrigation, excellent hemostasis.

## 2025-06-30 NOTE — BRIEF OPERATIVE NOTE - NSICDXBRIEFPROCEDURE_GEN_ALL_CORE_FT
PROCEDURES:  Robot-assisted total laparoscopic hysterectomy with cystoscopy 30-Jun-2025 12:20:35  Felecia Yeboah  Bilateral salpingectomy 30-Jun-2025 12:20:42  Felecia Yeboah  Upper vaginectomy 30-Jun-2025 12:20:53  Felecia Yeboah  Lysis of pelvic adhesions 30-Jun-2025 12:21:02  Felecia Yeboah  Robot-assisted laparoscopic right ovarian cystectomy 30-Jun-2025 12:22:27  Felecia Yeboah  Laparoscopic surgical removal of peritoneum 30-Jun-2025 12:23:12  Felecia Yeboah

## 2025-06-30 NOTE — BRIEF OPERATIVE NOTE - SPECIMENS
Uterus, Cervix, Upper vagina, Bilateral tubes. Right ovarian cyst wall. Pelvic peritoneum. Ovarian endometriosis.

## 2025-06-30 NOTE — ASU DISCHARGE PLAN (ADULT/PEDIATRIC) - ASU DC SPECIAL INSTRUCTIONSFT
A PA will call you in 1 week to check on your post-op recovery  Please call the office to make follow-up with your physician in two weeks for full postop check and to review final pathology      Please take naproxen-1 tablet every 12 hours x 3 days, may take tylenol as needed for any additional pain  May walk and climb stairs as often as youd like, no vigorous activity. Detail Level: Zone Plan: Physician Orders: Radiotherapy Treatment Plan: Superficial Radiotherapy with Ultrasound\\nPlan: This patient has been treated today with image-guided superficial radiation therapy for non-melanoma\\nskin cancer. Written informed consent has been previously obtained from this patient for this treatment. This\\nconsent is documented in the patient's chart. The patient gave verbal consent to continue treatment today.\\nThe patient was treated with a specific radiation dose and setup as prescribed by the provider listed on this\\nvisit note. A Radiation Therapist performed administration of radiation under the supervision of a provider.\\nThe treatment parameters and cumulative dose are indicated above. Prior to administering the radiation, the\\npatient underwent a verification therapeutic radiology simulation-aided field setting defining relevant normal\\nand abnormal target anatomy and acquiring images with a separate and distinct diagnostic high-frequency\\nultrasound to delineate tissues and determine whether to proceed with delivery of therapeutic, in addition to\\nretrieve data necessary to develop an optimal radiation treatment process for the patient. The field\\nplacement simulation documents any change seen in overall tumor volume documented in the patient’s\\nrecord, allows the clinician to indicate any needed changes in the treatment plan and/or prescription,\\nprovides diagnostic evaluation as the basis for performing the therapeutic procedure, and clearly identifies\\nthe information needed to decide to proceed with the therapeutic procedure. This process includes\\nverification of the treatment port(s) and proper treatment positioning. All treatment ports were\\nphotographed within electronic medical records. The patient's lead blocking along with gross tumor volume\\nand margin was confirmed. Considering superficial radiotherapy is clinical in setup, this requires the physician\\nand radiation therapist to clarify the location interest being treated against initial images, ultrasound,\\npathology, and patient anatomy. Care was taken to ensure erwin treated were geometrically accurate and\\nproperly positioned using therapeutic radiology simulation-aided field setting verification per fraction. This\\nprocess is also utilized to determine if any prescription or setup changes are necessary. These steps are\\ntherefore medically necessary to ensure safe and effective administration of radiation. Ongoing therapeutic\\nradiology simulation-aided field setting verification is ordered throughout the course of therapy.\\nA high-frequency ultrasound image was acquired today for a two-dimensional evaluation of the tumor\\nvolume, depth, width, breadth, review of prior response to treatment, provide geometric accuracy of field\\nplacement, and determine whether to proceed with therapeutic delivery.\\nHigh frequency ultrasound depth is 2.36 mm, which is +0.20 mm in difference from previous imaging.\\nThe field placement and ultrasound imaging, per fraction, is separate and distinct from the initial simulation\\nand is an important task in providing safe administration of superficial radiation therapy. Physician evaluation\\nof the ultrasound information will be ongoing throughout the course of treatment and is deemed medically\\nnecessary to ensure the efficacy of treatment, whether to proceed with therapeutic delivery, and determine\\sherrie necessary changes. Today's images were evaluated for determination of continuation of treatment with\\nthe current plan or with necessary changes as appropriate. According to the review of verification\\ntherapeutic radiology simulation-aided field setting and imaging, no change is required.\\nAdditionally, the use of ultrasound visualization and targeted assessment allows the patient to be able to see\\nher cancer progress, encouraging the patient to complete and maintain compliance through the full\\ncourse of prescribed radiotherapy. Per Dr. Vincent, continued ultrasound guidance and therapeutic radiology\\nsimulation-aided field setting verification per fraction is required for field placement, measurement of tumor\\ndepth, tissues evaluation, progress, acute effect monitoring, and determination for therapeutic treatment\\ndelivery is appropriate.

## 2025-06-30 NOTE — ASU DISCHARGE PLAN (ADULT/PEDIATRIC) - NS MD DC FALL RISK RISK
For information on Fall & Injury Prevention, visit: [email or web address] OR  [email or web address] OR  https://www.cdc.gov/steadi/patient.html

## 2025-06-30 NOTE — ASU DISCHARGE PLAN (ADULT/PEDIATRIC) - PROCEDURE
DOLLY ELY, R. ovarian cystectomy, peritonectomy & cystoscopy. DOLLY ELY, R. ovarian cystectomy, peritonectomy, upper vaginectomy & cystoscopy.

## 2025-06-30 NOTE — ASU DISCHARGE PLAN (ADULT/PEDIATRIC) - CARE PROVIDER_API CALL
Ramila Urban  Gynecologic Oncology  [address] [place] [id number]  Phone: [phone]  Fax: [phone]  Follow Up Time:

## 2025-06-30 NOTE — ASU PREOP CHECKLIST - VERIFY SURGICAL SITE/SIDE WITH PATIENT
done balance training/gait training/GOAL: pt will negotiate 3 steps (I) w/i 2wks/transfer training/bed mobility training

## 2025-06-30 NOTE — ASU DISCHARGE PLAN (ADULT/PEDIATRIC) - FINANCIAL ASSISTANCE
[facility] provides services at a reduced cost to those who are determined to be eligible through [facility]’s financial assistance program. Information regarding [facility]’s financial assistance program can be found by going to [email or web address] or by calling [phone].

## 2025-07-01 PROBLEM — M51.26 OTHER INTERVERTEBRAL DISC DISPLACEMENT, LUMBAR REGION: Chronic | Status: ACTIVE | Noted: 2025-06-10

## 2025-07-08 LAB — SURGICAL PATHOLOGY STUDY: SIGNIFICANT CHANGE UP

## 2025-07-16 ENCOUNTER — APPOINTMENT (OUTPATIENT)
Dept: GYNECOLOGIC ONCOLOGY | Facility: CLINIC | Age: 50
End: 2025-07-16
Payer: COMMERCIAL

## 2025-07-16 PROCEDURE — 99213 OFFICE O/P EST LOW 20 MIN: CPT

## 2025-07-22 PROBLEM — N89.8 OTHER SPECIFIED NONINFLAMMATORY DISORDERS OF VAGINA: Chronic | Status: ACTIVE | Noted: 2025-06-10

## 2025-07-22 PROBLEM — M19.90 UNSPECIFIED OSTEOARTHRITIS, UNSPECIFIED SITE: Chronic | Status: ACTIVE | Noted: 2025-06-10

## 2025-07-22 PROBLEM — G43.909 MIGRAINE, UNSPECIFIED, NOT INTRACTABLE, WITHOUT STATUS MIGRAINOSUS: Chronic | Status: ACTIVE | Noted: 2025-06-10

## 2025-08-11 ENCOUNTER — APPOINTMENT (OUTPATIENT)
Dept: GYNECOLOGIC ONCOLOGY | Facility: CLINIC | Age: 50
End: 2025-08-11
Payer: COMMERCIAL

## 2025-08-11 VITALS
HEIGHT: 71 IN | RESPIRATION RATE: 16 BRPM | WEIGHT: 220 LBS | BODY MASS INDEX: 30.8 KG/M2 | HEART RATE: 80 BPM | SYSTOLIC BLOOD PRESSURE: 154 MMHG | OXYGEN SATURATION: 96 % | DIASTOLIC BLOOD PRESSURE: 89 MMHG

## 2025-08-11 DIAGNOSIS — N89.8 OTHER SPECIFIED NONINFLAMMATORY DISORDERS OF VAGINA: ICD-10-CM

## 2025-08-11 DIAGNOSIS — N80.9 ENDOMETRIOSIS, UNSPECIFIED: ICD-10-CM

## 2025-08-11 PROCEDURE — 99024 POSTOP FOLLOW-UP VISIT: CPT

## (undated) DEVICE — WARMING BLANKET UPPER ADULT

## (undated) DEVICE — XI SEAL UNIVERSIAL 5-12MM

## (undated) DEVICE — VENODYNE/SCD SLEEVE CALF MEDIUM

## (undated) DEVICE — DRAPE TOWEL BLUE STICKY

## (undated) DEVICE — XI ARM FORCE BIPOLAR 8MM

## (undated) DEVICE — RUMI TIP BLUE 6.7MM X 8CM

## (undated) DEVICE — RUMI TIP GREEN 6.7MM X 10CM

## (undated) DEVICE — INSUFFLATION NDL COVIDIEN SURGINEEDLE VERESS 120MM

## (undated) DEVICE — LIGASURE BLUNT TIP 5MM X 37CM

## (undated) DEVICE — DRSG KERLIX ROLL LG 4.5"

## (undated) DEVICE — SUT VLOC 180 0 9" GS-21 GREEN

## (undated) DEVICE — FOLEY TRAY 16FR 5CC LF LUBRISIL ADVANCE TEMP CLOSED

## (undated) DEVICE — SOL IRR POUR NS 0.9% 1000ML

## (undated) DEVICE — XI SCISSOR TIP COVER

## (undated) DEVICE — XI ARM SCISSOR MONO CURVED

## (undated) DEVICE — RUMI KOH-EFFICIENT 3.5CM

## (undated) DEVICE — PREP CHLORAPREP HI-LITE ORANGE 26ML

## (undated) DEVICE — GLV 7.5 PROTEXIS (WHITE)

## (undated) DEVICE — XI ARM NEEDLE DRIVER MEGA

## (undated) DEVICE — DEVICE PUREVIEW ACTIVE

## (undated) DEVICE — DRSG DERMABOND 0.7ML

## (undated) DEVICE — XI CORD MONOPOLAR CAUTERY (GREEN)

## (undated) DEVICE — XI CORD BIPOLAR CAUTERY (BLUE)

## (undated) DEVICE — RUMI TIP LAVENDER 5.1MM X 6CM

## (undated) DEVICE — RUMI KOH-EFFICIENT 2.5CM

## (undated) DEVICE — DRAPE ROBOTIC

## (undated) DEVICE — XI OBTURATOR OPTICAL BLADELESS 8MM

## (undated) DEVICE — Device

## (undated) DEVICE — PREP TRAY DRY SKIN PREP SCRUB

## (undated) DEVICE — PACK ROBOTIC

## (undated) DEVICE — POSITIONER FOAM EGG CRATE ULNAR 2PCS (PINK)

## (undated) DEVICE — SOL INJ NS 0.9% 100ML

## (undated) DEVICE — SOL IRR POUR H2O 1000ML

## (undated) DEVICE — RUMI KOH-EFFICIENT 4.0CM

## (undated) DEVICE — RUMI KOH-EFFICIENT 3.0CM

## (undated) DEVICE — D HELP - CLEARVIEW CLEARIFY SYSTEM

## (undated) DEVICE — SUT MONOCRYL 4-0 27" PS-2 UNDYED

## (undated) DEVICE — XI ARM FORCEP PROGRASP 8MM

## (undated) DEVICE — LIGASURE ATLAS 10MM 20CM

## (undated) DEVICE — ELCTR LAPAROSCOPIC MONOPOLAR CORD

## (undated) DEVICE — RUMI TIP WHITE 6.7MM X 6CM

## (undated) DEVICE — TROCAR COVIDIEN VERSAPORT BLADELESS OPTICAL 5MM STANDARD

## (undated) DEVICE — RUMI TIP ORANGE 6.7MM X 12CM

## (undated) DEVICE — ELCTR GROUNDING PAD ADULT COVIDIEN

## (undated) DEVICE — ENDOCATCH ROBOTIC 8MM (PURPLE)

## (undated) DEVICE — PREP DYNA-HEX CHG 4% 4OZ BOTTLE (BACTOSHIELD)

## (undated) DEVICE — POSITIONER PINK PAD PIGAZZI SYSTEM